# Patient Record
Sex: FEMALE | Race: WHITE | NOT HISPANIC OR LATINO | Employment: UNEMPLOYED | URBAN - METROPOLITAN AREA
[De-identification: names, ages, dates, MRNs, and addresses within clinical notes are randomized per-mention and may not be internally consistent; named-entity substitution may affect disease eponyms.]

---

## 2017-02-24 ENCOUNTER — OFFICE VISIT (OUTPATIENT)
Dept: ENDOCRINOLOGY | Facility: CLINIC | Age: 57
End: 2017-02-24

## 2017-02-24 VITALS
OXYGEN SATURATION: 99 % | SYSTOLIC BLOOD PRESSURE: 124 MMHG | DIASTOLIC BLOOD PRESSURE: 78 MMHG | WEIGHT: 168.4 LBS | HEIGHT: 64 IN | BODY MASS INDEX: 28.75 KG/M2 | HEART RATE: 82 BPM

## 2017-02-24 DIAGNOSIS — D35.1 PARATHYROID ADENOMA: ICD-10-CM

## 2017-02-24 DIAGNOSIS — M85.80 OSTEOPENIA: ICD-10-CM

## 2017-02-24 DIAGNOSIS — E89.0 POSTABLATIVE HYPOTHYROIDISM: Primary | ICD-10-CM

## 2017-02-24 LAB
ALBUMIN SERPL-MCNC: 4.6 G/DL (ref 3.2–4.8)
ALBUMIN/GLOB SERPL: 1.7 G/DL (ref 1.5–2.5)
ALP SERPL-CCNC: 80 U/L (ref 25–100)
ALT SERPL W P-5'-P-CCNC: 22 U/L (ref 7–40)
ANION GAP SERPL CALCULATED.3IONS-SCNC: 7 MMOL/L (ref 3–11)
AST SERPL-CCNC: 23 U/L (ref 0–33)
BILIRUB SERPL-MCNC: 0.4 MG/DL (ref 0.3–1.2)
BUN BLD-MCNC: 20 MG/DL (ref 9–23)
BUN/CREAT SERPL: 25 (ref 7–25)
CALCIUM SPEC-SCNC: 10.6 MG/DL (ref 8.7–10.4)
CHLORIDE SERPL-SCNC: 105 MMOL/L (ref 99–109)
CO2 SERPL-SCNC: 31 MMOL/L (ref 20–31)
CREAT BLD-MCNC: 0.8 MG/DL (ref 0.6–1.3)
GFR SERPL CREATININE-BSD FRML MDRD: 74 ML/MIN/1.73
GLOBULIN UR ELPH-MCNC: 2.7 GM/DL
GLUCOSE BLD-MCNC: 89 MG/DL (ref 70–100)
POTASSIUM BLD-SCNC: 4.2 MMOL/L (ref 3.5–5.5)
PROT SERPL-MCNC: 7.3 G/DL (ref 5.7–8.2)
SODIUM BLD-SCNC: 143 MMOL/L (ref 132–146)
T4 FREE SERPL-MCNC: 1.28 NG/DL (ref 0.89–1.76)
TSH SERPL DL<=0.05 MIU/L-ACNC: 3.04 MIU/ML (ref 0.35–5.35)

## 2017-02-24 PROCEDURE — 84439 ASSAY OF FREE THYROXINE: CPT | Performed by: INTERNAL MEDICINE

## 2017-02-24 PROCEDURE — 99244 OFF/OP CNSLTJ NEW/EST MOD 40: CPT | Performed by: INTERNAL MEDICINE

## 2017-02-24 PROCEDURE — 80053 COMPREHEN METABOLIC PANEL: CPT | Performed by: INTERNAL MEDICINE

## 2017-02-24 PROCEDURE — 84443 ASSAY THYROID STIM HORMONE: CPT | Performed by: INTERNAL MEDICINE

## 2017-02-24 RX ORDER — FAMOTIDINE 20 MG/1
40 TABLET, FILM COATED ORAL
COMMUNITY
Start: 2014-12-12 | End: 2018-07-26 | Stop reason: ALTCHOICE

## 2017-02-24 RX ORDER — HYDROCHLOROTHIAZIDE 12.5 MG/1
CAPSULE, GELATIN COATED ORAL
COMMUNITY
Start: 2014-02-13 | End: 2018-07-26 | Stop reason: SDUPTHER

## 2017-02-24 RX ORDER — LEVOTHYROXINE SODIUM 100 UG/1
100 CAPSULE ORAL DAILY
Qty: 30 CAPSULE | Refills: 11 | Status: SHIPPED | OUTPATIENT
Start: 2017-02-24 | End: 2017-09-27 | Stop reason: DRUGHIGH

## 2017-02-24 RX ORDER — LEVOTHYROXINE SODIUM 112 MCG
TABLET ORAL
COMMUNITY
Start: 2017-02-01 | End: 2017-06-01 | Stop reason: ALTCHOICE

## 2017-02-24 RX ORDER — AMLODIPINE BESYLATE 5 MG/1
TABLET ORAL
COMMUNITY
Start: 2014-02-13 | End: 2018-07-26 | Stop reason: SDUPTHER

## 2017-02-24 RX ORDER — NEPAFENAC 0.3 %
SUSPENSION, DROPS(FINAL DOSAGE FORM)(ML) OPHTHALMIC (EYE)
COMMUNITY
Start: 2016-12-07 | End: 2017-06-01

## 2017-02-24 RX ORDER — BUPROPION HYDROCHLORIDE 300 MG/1
TABLET ORAL
COMMUNITY
Start: 2017-02-14 | End: 2017-09-27

## 2017-02-24 RX ORDER — TRIPROLIDINE/PSEUDOEPHEDRINE 2.5MG-60MG
TABLET ORAL
COMMUNITY
Start: 2017-02-12 | End: 2017-06-01

## 2017-02-24 RX ORDER — POTASSIUM CHLORIDE 1500 MG/1
TABLET, EXTENDED RELEASE ORAL
COMMUNITY
Start: 2017-02-01 | End: 2018-07-26 | Stop reason: SDUPTHER

## 2017-02-24 NOTE — PROGRESS NOTES
Subjective:   Thyroid Problem (New pt, Graves' Disease. Pt stated she had a parathyroidectomy in Jan. 2017. C/o unstable tsh levels for over 26 years. Having hair loss and fatigue. )        Carissa Medina is a 56 y.o. female who is being seen for consultation today at the request of Benji Elise MD  for management of Graves' disease and hypothyroidism.     Radioiodine indiced hypothyroidism since 2000. She has a diagnoses of Graves disease at the age of 21.  Underwent PETE tx in 2000.  she started levothyroxine replacement shortly afterwards and reported continuous change in the dose.   In the last year she required 6 dose adjustments in the medication. She takes it on the empty stomach first thing in the morning, multivitamins famotidine and other medications are approximately 1 hour later. She is compliant and consistent with medication. Patient reported sensitivity to minor dose change in thyroid medication with symptoms of agitation, internal tremors, anxiety and palpitations when TSH is below 1.5. Her sx of hypothyroidism occur when she is over 4. She reported that she doesn't seem to regulate the levels.   Labs 08/25/2016 showed TSH of 4.08, free T4 of total T4 of 8.3, PTH level is 47 and ionized calcium is normal at 5.6. Her levels were increased. She was dx with primary hyperparathyroidism and underwent parathyroidectomy by DR Louis in January 2016. Her sx of fatigue and bone pains improved immediately after the surgery. Calcium was normal at 9.9 in 1/17/2017  She brought records from the last 6 months labs and they were reviewed and summarized.   Michael 3 2017 - TSH is 4.59 and the dose increased to 112. 1/17/2017 labs in Dr Louis's office showed TSH of 2.5    Current Symptoms consist of anxiousness, tremulousness, weight loss, increased appetite. Feels that her levels might be too high.      She has started a gluten free diet together with her  and noted that when she is strict with the diet then  her thyroid levels are going up and she needs a dose change in medication.     Review of Systems  Review of Systems   Constitutional: Positive for fatigue and unexpected weight change (fluctuations in weight). Negative for activity change, appetite change, chills and diaphoresis.   HENT: Negative for congestion, ear pain, facial swelling, hearing loss, postnasal drip, sore throat, trouble swallowing and voice change.    Eyes: Negative.  Negative for redness, itching and visual disturbance.   Respiratory: Negative for cough and shortness of breath.    Cardiovascular: Negative for chest pain, palpitations and leg swelling.   Gastrointestinal: Negative for abdominal distention, abdominal pain, constipation, diarrhea, nausea and vomiting.   Endocrine:        As listed in HPI   Genitourinary: Negative.    Musculoskeletal: Negative for arthralgias, back pain, joint swelling, myalgias, neck pain and neck stiffness.   Skin: Negative.    Allergic/Immunologic: Positive for environmental allergies (gluten sensitivity).   Neurological: Positive for tremors. Negative for dizziness, syncope, weakness, light-headedness and headaches.   Hematological: Negative.    Psychiatric/Behavioral: The patient is nervous/anxious.    All other systems reviewed and are negative.     Current medications:  Current Outpatient Prescriptions   Medication Sig Dispense Refill   • amLODIPine (NORVASC) 5 MG tablet Take  by mouth.     • aspirin 81 MG tablet Take  by mouth Daily.     • famotidine (PEPCID) 20 MG tablet Take  by mouth.     • hydrochlorothiazide (MICROZIDE) 12.5 MG capsule Take  by mouth.     • buPROPion XL (WELLBUTRIN XL) 300 MG 24 hr tablet      • DUREZOL 0.05 % ophthalmic emulsion      • ILEVRO 0.3 % suspension      • KLOR-CON 20 MEQ CR tablet      • SYNTHROID 112 MCG tablet      • TIROSINT 100 MCG capsule Take 1 capsule by mouth Daily. Patient requiring frequent dose change with the Synthroid due to absorption problem 30 capsule 11      No current facility-administered medications for this visit.        The following portions of the patient's history were reviewed and updated as appropriate: She  has a past medical history of Acid reflux; Depression; Graves disease; HTN (hypertension); Hyperlipidemia; Migraine; Osteopenia; and Parathyroid adenoma.  She  has a past surgical history that includes  section, classic; Sinus surgery; Cataract extraction; Parathyroidectomy (01/10/2017); and Vitrectomy.  Her family history includes Arthritis in her father and mother; Graves' disease in her father; Hyperlipidemia in her mother; Hypertension in her father and mother; Kidney disease in her father; Melanoma in her father; Prostate cancer in her father; Stroke in her mother.  She  reports that she has never smoked. She does not have any smokeless tobacco history on file. She reports that she does not drink alcohol. Her drug history is not on file.  She is allergic to estrogens; statins; and zoloft [sertraline hcl]..      Objective:     Vitals:    17 0906   BP: 124/78   Pulse: 82   SpO2: 99%   Body mass index is 28.91 kg/(m^2).  Physical Exam   Constitutional: She is oriented to person, place, and time. She appears well-developed and well-nourished.   HENT:   Head: Normocephalic and atraumatic.   Mouth/Throat: Oropharynx is clear and moist.   Eyes: Conjunctivae are normal.   Neck: Normal range of motion. No muscular tenderness present. Carotid bruit is not present. No thyroid mass and no thyromegaly present.   The neck is supple and no assimetry visualized   Cardiovascular: Normal rate, regular rhythm and normal heart sounds.    Pulmonary/Chest: Effort normal and breath sounds normal.   Musculoskeletal: She exhibits no edema.   Lymphadenopathy:     She has no cervical adenopathy.   Neurological: She is alert and oriented to person, place, and time.   Skin: Skin is warm. No rash noted.   Psychiatric: She has a normal mood and affect. Thought  content normal.   Vitals reviewed.      LABS AND IMAGING  Labs wer reviewed and summarized above.             Assessment:        Problem List Items Addressed This Visit        Endocrine    Postablative hypothyroidism - Primary    Relevant Medications    SYNTHROID 112 MCG tablet    TIROSINT 100 MCG capsule    Other Relevant Orders    TSH    T4, Free    Comprehensive Metabolic Panel      Other Visit Diagnoses     Parathyroid adenoma        Osteopenia            Diagnosis was discussed and reviewed with the patient including the advantages of drug therapy.          Plan:        1. Patient appears clinically mildly hyperthyroid. She is very sensitive to any dose change and has a narrow TSH window for symptomatic response.   Tirosint could be a good option for this patient as absorption is more consistent,   -sample of lower dose 100 mcg Tirosint given  -repeat labs and I will give further recommendations based on the results.       We have discussed in details the nature of the thyroid disease, thyroid hormone action, optimal TSH goals of 0.5-3.5, method of administration of levothyroxine and medication interactions.  I recommended taking the medication on an empty stomach in the morning or at bedtime, at least 30 minutes prior to intake of food or hot drinks and 4 hours apart from calcium or iron supplements.    Patient will return to our office in 3 months with labs before the visit.   The risks and benefits of my recommendations, as well as other treatment options were discussed with the patient today. Questions were answered.

## 2017-03-14 ENCOUNTER — TRANSCRIBE ORDERS (OUTPATIENT)
Dept: ADMINISTRATIVE | Facility: HOSPITAL | Age: 57
End: 2017-03-14

## 2017-03-14 DIAGNOSIS — Z12.31 VISIT FOR SCREENING MAMMOGRAM: Primary | ICD-10-CM

## 2017-04-24 ENCOUNTER — HOSPITAL ENCOUNTER (OUTPATIENT)
Dept: MAMMOGRAPHY | Facility: HOSPITAL | Age: 57
Discharge: HOME | End: 2017-04-24
Admitting: OBSTETRICS & GYNECOLOGY

## 2017-04-24 DIAGNOSIS — Z12.31 VISIT FOR SCREENING MAMMOGRAM: ICD-10-CM

## 2017-04-24 PROCEDURE — 77063 BREAST TOMOSYNTHESIS BI: CPT

## 2017-04-24 PROCEDURE — 77063 BREAST TOMOSYNTHESIS BI: CPT | Performed by: RADIOLOGY

## 2017-04-24 PROCEDURE — G0202 SCR MAMMO BI INCL CAD: HCPCS

## 2017-04-24 PROCEDURE — 77067 SCR MAMMO BI INCL CAD: CPT | Performed by: RADIOLOGY

## 2017-05-09 ENCOUNTER — TELEPHONE (OUTPATIENT)
Dept: INTERNAL MEDICINE | Facility: CLINIC | Age: 57
End: 2017-05-09

## 2017-05-09 DIAGNOSIS — E05.00 GRAVES DISEASE: Primary | ICD-10-CM

## 2017-05-24 ENCOUNTER — LAB (OUTPATIENT)
Dept: INTERNAL MEDICINE | Facility: CLINIC | Age: 57
End: 2017-05-24

## 2017-05-24 DIAGNOSIS — E05.00 GRAVES DISEASE: ICD-10-CM

## 2017-05-24 LAB
ALBUMIN SERPL-MCNC: 4.4 G/DL (ref 3.2–4.8)
ALBUMIN/GLOB SERPL: 1.8 G/DL (ref 1.5–2.5)
ALP SERPL-CCNC: 60 U/L (ref 25–100)
ALT SERPL W P-5'-P-CCNC: 19 U/L (ref 7–40)
ANION GAP SERPL CALCULATED.3IONS-SCNC: 3 MMOL/L (ref 3–11)
AST SERPL-CCNC: 23 U/L (ref 0–33)
BILIRUB SERPL-MCNC: 0.5 MG/DL (ref 0.3–1.2)
BUN BLD-MCNC: 18 MG/DL (ref 9–23)
BUN/CREAT SERPL: 25.7 (ref 7–25)
CA-I SERPL ISE-MCNC: 1.25 MMOL/L (ref 1.12–1.32)
CALCIUM SPEC-SCNC: 9.7 MG/DL (ref 8.7–10.4)
CHLORIDE SERPL-SCNC: 105 MMOL/L (ref 99–109)
CO2 SERPL-SCNC: 32 MMOL/L (ref 20–31)
CREAT BLD-MCNC: 0.7 MG/DL (ref 0.6–1.3)
GFR SERPL CREATININE-BSD FRML MDRD: 86 ML/MIN/1.73
GLOBULIN UR ELPH-MCNC: 2.4 GM/DL
GLUCOSE BLD-MCNC: 97 MG/DL (ref 70–100)
POTASSIUM BLD-SCNC: 3.9 MMOL/L (ref 3.5–5.5)
PROT SERPL-MCNC: 6.8 G/DL (ref 5.7–8.2)
SODIUM BLD-SCNC: 140 MMOL/L (ref 132–146)
T4 FREE SERPL-MCNC: 1.19 NG/DL (ref 0.89–1.76)
TSH SERPL DL<=0.05 MIU/L-ACNC: 3.98 MIU/ML (ref 0.35–5.35)

## 2017-05-24 PROCEDURE — 80053 COMPREHEN METABOLIC PANEL: CPT | Performed by: INTERNAL MEDICINE

## 2017-05-24 PROCEDURE — 84443 ASSAY THYROID STIM HORMONE: CPT | Performed by: INTERNAL MEDICINE

## 2017-05-24 PROCEDURE — 84439 ASSAY OF FREE THYROXINE: CPT | Performed by: INTERNAL MEDICINE

## 2017-05-24 PROCEDURE — 82330 ASSAY OF CALCIUM: CPT | Performed by: INTERNAL MEDICINE

## 2017-06-01 ENCOUNTER — OFFICE VISIT (OUTPATIENT)
Dept: ENDOCRINOLOGY | Facility: CLINIC | Age: 57
End: 2017-06-01

## 2017-06-01 VITALS
BODY MASS INDEX: 28.82 KG/M2 | WEIGHT: 168.8 LBS | HEIGHT: 64 IN | DIASTOLIC BLOOD PRESSURE: 76 MMHG | OXYGEN SATURATION: 96 % | HEART RATE: 54 BPM | SYSTOLIC BLOOD PRESSURE: 124 MMHG

## 2017-06-01 DIAGNOSIS — E89.0 POSTABLATIVE HYPOTHYROIDISM: Primary | ICD-10-CM

## 2017-06-01 PROCEDURE — 99213 OFFICE O/P EST LOW 20 MIN: CPT | Performed by: INTERNAL MEDICINE

## 2017-06-01 RX ORDER — POLYETHYLENE GLYCOL 3350 17 G/17G
POWDER, FOR SOLUTION ORAL
COMMUNITY

## 2017-06-01 RX ORDER — ECHINACEA 400 MG
CAPSULE ORAL
COMMUNITY

## 2017-06-01 NOTE — PROGRESS NOTES
Subjective:   Hypothyroidism (F/u for hypothyroidism, would like to discuss results of recent labs. Pt stated she has been feeling much better on Tirosint)         Follow-up visit for management of Graves' disease and hypothyroidism.     Radioiodine indiced hypothyroidism since 2000. She has a diagnoses of Graves disease at the age of 21.  Underwent PETE tx in 2000.  she started levothyroxine replacement shortly afterwards and reported continuous change in the dose.   In the last year she required 6 dose adjustments in the medication. She takes it on the empty stomach first thing in the morning, multivitamins famotidine and other medications are approximately 1 hour later. She is compliant and consistent with medication. Patient reported sensitivity to minor dose change in thyroid medication with symptoms of agitation, internal tremors, anxiety and palpitations when TSH is below 1.5. Her sx of hypothyroidism occur when she is over 4. She reported that she doesn't seem to regulate the levels.   Labs 08/25/2016 showed TSH of 4.08, free T4 of total T4 of 8.3, PTH level is 47 and ionized calcium is normal at 5.6. Her levels were increased. She was dx with primary hyperparathyroidism and underwent parathyroidectomy by DR Louis in January 2016. Her sx of fatigue and bone pains improved immediately after the surgery. Calcium was normal at 9.9 in 1/17/2017  She brought records from the last 6 months labs and they were reviewed and summarized.   Michael 3 2017 - TSH is 4.59 and the dose increased to 112. 1/17/2017 labs in Dr Louis's office showed TSH of 2.5. Calcium was 10.6 on previous results and repeat labs are normal.   Last visit her tsh was 3 and we have changed her tx to Tirosint 100 mcg daily. Feels better and her sx are stable.      She has started a gluten free diet together with her  and noted that when she is strict with the diet then her thyroid levels are going up and she needs a dose change in medication.          Review of Systems  Review of Systems   Constitutional: Positive for fatigue and unexpected weight change (fluctuations in weight). Negative for activity change, appetite change, chills and diaphoresis.   HENT: Negative for congestion, ear pain, facial swelling, hearing loss, postnasal drip, sore throat, trouble swallowing and voice change.    Eyes: Negative.  Negative for redness, itching and visual disturbance.   Respiratory: Negative for cough and shortness of breath.    Cardiovascular: Negative for chest pain, palpitations and leg swelling.   Gastrointestinal: Negative for abdominal distention, abdominal pain, constipation, diarrhea, nausea and vomiting.   Endocrine:        As listed in HPI   Genitourinary: Negative.    Musculoskeletal: Negative for arthralgias, back pain, joint swelling, myalgias, neck pain and neck stiffness.   Skin: Negative.    Allergic/Immunologic: Positive for environmental allergies (gluten sensitivity).   Neurological: Positive for tremors. Negative for dizziness, syncope, weakness, light-headedness and headaches.   Hematological: Negative.    Psychiatric/Behavioral: The patient is nervous/anxious.    All other systems reviewed and are negative.     Current medications:  Current Outpatient Prescriptions   Medication Sig Dispense Refill   • vitamin d (RA VITAMIN D-3) 5000 UNITS capsule Take  by mouth.     • amLODIPine (NORVASC) 5 MG tablet Take  by mouth.     • aspirin 81 MG tablet Take  by mouth Daily.     • buPROPion XL (WELLBUTRIN XL) 300 MG 24 hr tablet      • famotidine (PEPCID) 20 MG tablet Take  by mouth.     • Flaxseed, Linseed, (FLAXSEED OIL) 1000 MG capsule      • hydrochlorothiazide (MICROZIDE) 12.5 MG capsule Take  by mouth.     • KLOR-CON 20 MEQ CR tablet      • Magnesium 400 MG capsule      • Multiple Vitamins-Minerals (MULTIVITAMIN WOMEN 50+) tablet      • polyethylene glycol (MIRALAX) powder      • Probiotic Product (ACIDOPHILUS PROBIOTIC BLEND PO)      • TIROSINT  100 MCG capsule Take 1 capsule by mouth Daily. Patient requiring frequent dose change with the Synthroid due to absorption problem 30 capsule 11     No current facility-administered medications for this visit.        The following portions of the patient's history were reviewed and updated as appropriate: She  has a past medical history of Acid reflux; Depression; Graves disease; HTN (hypertension); Hyperlipidemia; Migraine; Osteopenia; and Parathyroid adenoma.  She  has a past surgical history that includes  section, classic; Sinus surgery; Cataract extraction; Parathyroidectomy (01/10/2017); and Vitrectomy.  Her family history includes Arthritis in her father and mother; Graves' disease in her father; Hyperlipidemia in her mother; Hypertension in her father and mother; Kidney disease in her father; Melanoma in her father; Prostate cancer in her father; Stroke in her mother. There is no history of Breast cancer or Ovarian cancer.  She  reports that she has never smoked. She does not have any smokeless tobacco history on file. She reports that she does not drink alcohol. Her drug history is not on file.  She is allergic to estrogens; statins; and zoloft [sertraline hcl]..      Objective:     Vitals:    17 1044   BP: 124/76   Pulse: 54   SpO2: 96%   Body mass index is 28.97 kg/(m^2).  Physical Exam   Constitutional: She is oriented to person, place, and time. She appears well-developed and well-nourished.   HENT:   Head: Normocephalic and atraumatic.   Mouth/Throat: Oropharynx is clear and moist.   Eyes: Conjunctivae are normal.   Neck: Normal range of motion. No muscular tenderness present. Carotid bruit is not present. No thyroid mass and no thyromegaly present.   The neck is supple and no assimetry visualized   Cardiovascular: Normal rate, regular rhythm and normal heart sounds.    Pulmonary/Chest: Effort normal and breath sounds normal.   Musculoskeletal: She exhibits no edema.   Lymphadenopathy:      She has no cervical adenopathy.   Neurological: She is alert and oriented to person, place, and time.   Skin: Skin is warm. No rash noted.   Psychiatric: She has a normal mood and affect. Thought content normal.   Vitals reviewed.      LABS AND IMAGING  Results for orders placed or performed in visit on 05/24/17   TSH   Result Value Ref Range    TSH 3.980 0.350 - 5.350 mIU/mL   T4, Free   Result Value Ref Range    Free T4 1.19 0.89 - 1.76 ng/dL   Comprehensive Metabolic Panel   Result Value Ref Range    Glucose 97 70 - 100 mg/dL    BUN 18 9 - 23 mg/dL    Creatinine 0.70 0.60 - 1.30 mg/dL    Sodium 140 132 - 146 mmol/L    Potassium 3.9 3.5 - 5.5 mmol/L    Chloride 105 99 - 109 mmol/L    CO2 32.0 (H) 20.0 - 31.0 mmol/L    Calcium 9.7 8.7 - 10.4 mg/dL    Total Protein 6.8 5.7 - 8.2 g/dL    Albumin 4.40 3.20 - 4.80 g/dL    ALT (SGPT) 19 7 - 40 U/L    AST (SGOT) 23 0 - 33 U/L    Alkaline Phosphatase 60 25 - 100 U/L    Total Bilirubin 0.5 0.3 - 1.2 mg/dL    eGFR Non African Amer 86 >60 mL/min/1.73    Globulin 2.4 gm/dL    A/G Ratio 1.8 1.5 - 2.5 g/dL    BUN/Creatinine Ratio 25.7 (H) 7.0 - 25.0    Anion Gap 3.0 3.0 - 11.0 mmol/L   Calcium, Ionized   Result Value Ref Range    Ionized Calcium 1.25 1.12 - 1.32 mmol/L               Assessment:        Problem List Items Addressed This Visit        Endocrine    Postablative hypothyroidism - Primary        Diagnosis was discussed and reviewed with the patient including the advantages of drug therapy.          Plan:        -cont 100 mcg Tirosint given  -reviewed  labs   and thyroid function is normal.

## 2017-06-01 NOTE — PATIENT INSTRUCTIONS
Results for orders placed or performed in visit on 05/24/17   TSH   Result Value Ref Range    TSH 3.980 0.350 - 5.350 mIU/mL   T4, Free   Result Value Ref Range    Free T4 1.19 0.89 - 1.76 ng/dL   Comprehensive Metabolic Panel   Result Value Ref Range    Glucose 97 70 - 100 mg/dL    BUN 18 9 - 23 mg/dL    Creatinine 0.70 0.60 - 1.30 mg/dL    Sodium 140 132 - 146 mmol/L    Potassium 3.9 3.5 - 5.5 mmol/L    Chloride 105 99 - 109 mmol/L    CO2 32.0 (H) 20.0 - 31.0 mmol/L    Calcium 9.7 8.7 - 10.4 mg/dL    Total Protein 6.8 5.7 - 8.2 g/dL    Albumin 4.40 3.20 - 4.80 g/dL    ALT (SGPT) 19 7 - 40 U/L    AST (SGOT) 23 0 - 33 U/L    Alkaline Phosphatase 60 25 - 100 U/L    Total Bilirubin 0.5 0.3 - 1.2 mg/dL    eGFR Non African Amer 86 >60 mL/min/1.73    Globulin 2.4 gm/dL    A/G Ratio 1.8 1.5 - 2.5 g/dL    BUN/Creatinine Ratio 25.7 (H) 7.0 - 25.0    Anion Gap 3.0 3.0 - 11.0 mmol/L   Calcium, Ionized   Result Value Ref Range    Ionized Calcium 1.25 1.12 - 1.32 mmol/L

## 2017-08-29 ENCOUNTER — TRANSCRIBE ORDERS (OUTPATIENT)
Dept: ADMINISTRATIVE | Facility: HOSPITAL | Age: 57
End: 2017-08-29

## 2017-08-29 DIAGNOSIS — R79.89 LFT ELEVATION: ICD-10-CM

## 2017-08-29 DIAGNOSIS — R63.4 WEIGHT LOSS: ICD-10-CM

## 2017-08-29 DIAGNOSIS — R53.83 FATIGUE, UNSPECIFIED TYPE: Primary | ICD-10-CM

## 2017-08-29 DIAGNOSIS — M54.9 BACK PAIN WITH RADIATION: ICD-10-CM

## 2017-09-11 ENCOUNTER — HOSPITAL ENCOUNTER (OUTPATIENT)
Dept: CT IMAGING | Facility: HOSPITAL | Age: 57
Discharge: HOME | End: 2017-09-11
Attending: INTERNAL MEDICINE | Admitting: INTERNAL MEDICINE

## 2017-09-11 DIAGNOSIS — R79.89 LFT ELEVATION: ICD-10-CM

## 2017-09-11 DIAGNOSIS — R63.4 WEIGHT LOSS: ICD-10-CM

## 2017-09-11 DIAGNOSIS — M54.9 BACK PAIN WITH RADIATION: ICD-10-CM

## 2017-09-11 DIAGNOSIS — R53.83 FATIGUE, UNSPECIFIED TYPE: ICD-10-CM

## 2017-09-11 PROCEDURE — 0 IOPAMIDOL 61 % SOLUTION: Performed by: INTERNAL MEDICINE

## 2017-09-11 PROCEDURE — 74177 CT ABD & PELVIS W/CONTRAST: CPT

## 2017-09-11 RX ADMIN — IOPAMIDOL 95 ML: 612 INJECTION, SOLUTION INTRAVENOUS at 12:15

## 2017-09-27 ENCOUNTER — OFFICE VISIT (OUTPATIENT)
Dept: CARDIOLOGY | Facility: CLINIC | Age: 57
End: 2017-09-27

## 2017-09-27 VITALS
DIASTOLIC BLOOD PRESSURE: 80 MMHG | HEART RATE: 60 BPM | BODY MASS INDEX: 26.4 KG/M2 | SYSTOLIC BLOOD PRESSURE: 134 MMHG | HEIGHT: 64 IN | WEIGHT: 154.6 LBS

## 2017-09-27 DIAGNOSIS — R07.89 OTHER CHEST PAIN: Primary | ICD-10-CM

## 2017-09-27 DIAGNOSIS — E78.00 PURE HYPERCHOLESTEROLEMIA: ICD-10-CM

## 2017-09-27 DIAGNOSIS — I10 ESSENTIAL HYPERTENSION: ICD-10-CM

## 2017-09-27 DIAGNOSIS — R53.83 FATIGUE, UNSPECIFIED TYPE: ICD-10-CM

## 2017-09-27 PROCEDURE — 99213 OFFICE O/P EST LOW 20 MIN: CPT | Performed by: INTERNAL MEDICINE

## 2017-09-27 PROCEDURE — 93000 ELECTROCARDIOGRAM COMPLETE: CPT | Performed by: INTERNAL MEDICINE

## 2017-09-27 RX ORDER — LEVOTHYROXINE SODIUM 88 UG/1
88 CAPSULE ORAL DAILY
COMMUNITY
End: 2017-12-01

## 2017-09-27 RX ORDER — OLOPATADINE HYDROCHLORIDE 1 MG/ML
1 SOLUTION/ DROPS OPHTHALMIC AS NEEDED
COMMUNITY

## 2017-09-27 NOTE — PROGRESS NOTES
Subjective:     Encounter Date:2017      Patient ID: Carissa Medina is a 57 y.o. female.    Chief Complaint:Hypertension (consult); Fatigue; Dizziness; Shortness of Breath; and irregular heartbeat    PROBLEM LIST:  1. Fatigue  2. Dyslipidemia  3. Hypertension  4. GERD  5. Polymyalgia rheumatica  6. Temporal arteritis  7. History of Graves' disease status post radiation  8. Anxiety/depression  9. Migraine  10. Persistently, mildly elevated AST/ALT  11. Surgeries:  a. Bilateral cataract extraction  b.  section x 2  c. Sinus surgery  d. Vitrectomy  e. Parathyroidectomy     Allergies   Allergen Reactions   • Estrogens      Oral Estrogens   • Statins    • Zoloft [Sertraline Hcl]          Current Outpatient Prescriptions:   •  amLODIPine (NORVASC) 5 MG tablet, Take  by mouth., Disp: , Rfl:   •  aspirin 81 MG tablet, Take  by mouth Daily., Disp: , Rfl:   •  famotidine (PEPCID) 20 MG tablet, Take 40 mg by mouth., Disp: , Rfl:   •  Flaxseed, Linseed, (FLAXSEED OIL) 1000 MG capsule, , Disp: , Rfl:   •  hydrochlorothiazide (MICROZIDE) 12.5 MG capsule, Take  by mouth., Disp: , Rfl:   •  KLOR-CON 20 MEQ CR tablet, , Disp: , Rfl:   •  levothyroxine sodium (TIROSINT) 88 MCG capsule, Take 88 mcg by mouth Daily., Disp: , Rfl:   •  Magnesium 400 MG capsule, , Disp: , Rfl:   •  Multiple Vitamins-Minerals (MULTIVITAMIN WOMEN 50+) tablet, , Disp: , Rfl:   •  olopatadine (PATANOL) 0.1 % ophthalmic solution, 1 drop As Needed for Allergies., Disp: , Rfl:   •  polyethylene glycol (MIRALAX) powder, , Disp: , Rfl:   •  PREDNISONE PO, Take 25 mg by mouth Daily., Disp: , Rfl:   •  Probiotic Product (ACIDOPHILUS PROBIOTIC BLEND PO), , Disp: , Rfl:   •  vitamin d (RA VITAMIN D-3) 5000 UNITS capsule, Take  by mouth., Disp: , Rfl:         History of Present Illness    Patient is a 57-year-old  female who we are seeing today for further evaluation of fatigue.  She has no previous history of coronary disease.  She's been  recently diagnosed with polymyalgia rheumatica and started on steroid therapy.  She's had some fatigue for a few years.  She notes that she had her parathyroidectomy performed in January of this year and at that time it did seem to improve.  However in June it resumed.  At that time she was also noted to have some elevated LFTs for which she had a CT of her abdomen performed.  She is also been since that time diagnosed with polymyalgia rheumatica and temporal arteritis.  She has been started on steroids for this.  She does have some occasional chest pain which is followed by belching.  Does note some shortness of breath with activity.  However, notes that she has been fairly inactive due to her fatigue.  No reported syncope, near syncope.  Patient also notes that she recently went on a low carb diet.  She lost 12 pounds intentionally.  Notes that after this she had lipid panel 3 performed which showed a decreased HDL.  However, of note her LDL was improved.  Exertional mid scapular back pain.  Also is there when she stands too long.    The following portions of the patient's history were reviewed and updated as appropriate: allergies, current medications, past family history, past medical history, past social history, past surgical history and problem list.    Family History   Problem Relation Age of Onset   • Arthritis Mother    • Hypertension Mother    • Hyperlipidemia Mother    • Stroke Mother    • Arthritis Father    • Melanoma Father    • Prostate cancer Father    • Hypertension Father    • Kidney disease Father    • Graves' disease Father    • No Known Problems Sister    • No Known Problems Brother    • No Known Problems Brother    • No Known Problems Son    • No Known Problems Son    • Breast cancer Neg Hx    • Ovarian cancer Neg Hx        Social History   Substance Use Topics   • Smoking status: Never Smoker   • Smokeless tobacco: Never Used   • Alcohol use No         Review of Systems   Constitution:  "Positive for malaise/fatigue and weight loss (ntentional). Negative for fever and weakness.   HENT: Negative for nosebleeds.    Eyes: Negative for redness and visual disturbance.   Cardiovascular: Negative for orthopnea, palpitations and paroxysmal nocturnal dyspnea.   Respiratory: Negative for cough, snoring, sputum production and wheezing.    Hematologic/Lymphatic: Negative for bleeding problem.   Skin: Negative for flushing, itching and rash.   Musculoskeletal: Positive for back pain and myalgias. Negative for falls, joint pain and muscle cramps.   Gastrointestinal: Positive for heartburn. Negative for abdominal pain, diarrhea, nausea and vomiting.   Genitourinary: Positive for bladder incontinence. Negative for hematuria.   Neurological: Positive for excessive daytime sleepiness and dizziness. Negative for headaches and tremors.   Psychiatric/Behavioral: Negative for substance abuse. The patient is not nervous/anxious.           Objective:    height is 64\" (162.6 cm) and weight is 154 lb 9.6 oz (70.1 kg). Her blood pressure is 134/80 and her pulse is 60.         Physical Exam   Constitutional: She is oriented to person, place, and time. She appears well-developed and well-nourished.   HENT:   Head: Normocephalic and atraumatic.   Mouth/Throat: Oropharynx is clear and moist.   Eyes: Conjunctivae are normal. Pupils are equal, round, and reactive to light.   Neck: Normal carotid pulses and no JVD present. Carotid bruit is not present. No thyromegaly present.   Cardiovascular: Normal rate, regular rhythm, S1 normal and S2 normal.  Exam reveals no gallop and no friction rub.    No murmur heard.  Pulses:       Carotid pulses are 2+ on the right side, and 2+ on the left side.       Dorsalis pedis pulses are 2+ on the right side, and 2+ on the left side.        Posterior tibial pulses are 2+ on the right side, and 2+ on the left side.   Pulmonary/Chest: No respiratory distress. She has no wheezes. She has no rales. She " exhibits no tenderness.   Abdominal: She exhibits no distension, no abdominal bruit and no mass. There is no hepatosplenomegaly. There is no tenderness. There is no rebound.   Musculoskeletal: She exhibits no edema, tenderness or deformity.   Lymphadenopathy:     She has no cervical adenopathy.   Neurological: She is alert and oriented to person, place, and time. She has normal strength.   Skin: Skin is warm and dry. No rash noted. No cyanosis. Nails show no clubbing.   Psychiatric: She has a normal mood and affect. Cognition and memory are normal.         ECG 12 Lead  Date/Time: 9/27/2017 3:34 PM  Performed by: KALE PIKE  Authorized by: KALE PIKE   Rhythm: sinus rhythm  Clinical impression: normal ECG                  Assessment:   Assessment/Plan      Carissa was seen today for hypertension, fatigue, dizziness, shortness of breath and irregular heartbeat.    Diagnoses and all orders for this visit:    Other chest pain  -     Adult Transthoracic Echo Complete W/ Cont if Necessary Per Protocol; Future  -     Stress Test With Myocardial Perfusion; Future    Essential hypertension    Fatigue, unspecified type    Pure hypercholesterolemia    Other orders  -     ECG 12 Lead      Atypical chest pain and malaise fatigue, possibly anginal equivalent .  We'll perform echocardiography and myocardial perfusion study.  Dyslipidemia, .  Does not report statin therapy at this time.  Long discussion regarding pros and cons of this.  I do not think is necessary currently.  Recent diagnosis of temporal arteritis/PMR.  May be the cause of her symptoms.          Allison SWEET scribed portions of this dictation for  Dr. Pike.  I, Kale Pike MD, personally performed the services described in this documentation as scribed by the above individual in my presence, and it is both accurate and complete'  Dictated utilizing Dragon dictation

## 2017-10-02 ENCOUNTER — TRANSCRIBE ORDERS (OUTPATIENT)
Dept: ADMINISTRATIVE | Facility: HOSPITAL | Age: 57
End: 2017-10-02

## 2017-10-02 ENCOUNTER — HOSPITAL ENCOUNTER (OUTPATIENT)
Dept: GENERAL RADIOLOGY | Facility: HOSPITAL | Age: 57
Discharge: HOME | End: 2017-10-02
Attending: INTERNAL MEDICINE | Admitting: INTERNAL MEDICINE

## 2017-10-02 DIAGNOSIS — R76.11 POSITIVE TB TEST: Primary | ICD-10-CM

## 2017-10-02 DIAGNOSIS — M54.9 MID BACK PAIN: ICD-10-CM

## 2017-10-02 PROCEDURE — 71020 HC CHEST PA AND LATERAL: CPT

## 2017-10-02 PROCEDURE — 72072 X-RAY EXAM THORAC SPINE 3VWS: CPT

## 2017-10-18 ENCOUNTER — HOSPITAL ENCOUNTER (OUTPATIENT)
Dept: CARDIOLOGY | Facility: HOSPITAL | Age: 57
Discharge: HOME | End: 2017-10-18
Attending: INTERNAL MEDICINE

## 2017-10-18 ENCOUNTER — HOSPITAL ENCOUNTER (OUTPATIENT)
Dept: CARDIOLOGY | Facility: HOSPITAL | Age: 57
Discharge: HOME | End: 2017-10-18
Attending: INTERNAL MEDICINE | Admitting: INTERNAL MEDICINE

## 2017-10-18 VITALS
HEIGHT: 64 IN | DIASTOLIC BLOOD PRESSURE: 74 MMHG | BODY MASS INDEX: 26.12 KG/M2 | SYSTOLIC BLOOD PRESSURE: 136 MMHG | WEIGHT: 153 LBS | HEART RATE: 78 BPM

## 2017-10-18 DIAGNOSIS — R07.89 OTHER CHEST PAIN: ICD-10-CM

## 2017-10-18 PROCEDURE — 93306 TTE W/DOPPLER COMPLETE: CPT

## 2017-10-18 PROCEDURE — 78452 HT MUSCLE IMAGE SPECT MULT: CPT

## 2017-10-18 PROCEDURE — 0 TECHNETIUM SESTAMIBI: Performed by: INTERNAL MEDICINE

## 2017-10-18 PROCEDURE — 93018 CV STRESS TEST I&R ONLY: CPT | Performed by: INTERNAL MEDICINE

## 2017-10-18 PROCEDURE — 93306 TTE W/DOPPLER COMPLETE: CPT | Performed by: INTERNAL MEDICINE

## 2017-10-18 PROCEDURE — 93017 CV STRESS TEST TRACING ONLY: CPT

## 2017-10-18 PROCEDURE — A9500 TC99M SESTAMIBI: HCPCS | Performed by: INTERNAL MEDICINE

## 2017-10-18 PROCEDURE — 78452 HT MUSCLE IMAGE SPECT MULT: CPT | Performed by: INTERNAL MEDICINE

## 2017-10-18 RX ADMIN — TECHNETIUM TC-99M SESTAMIBI 1 DOSE: 1 INJECTION INTRAVENOUS at 10:10

## 2017-10-18 RX ADMIN — TECHNETIUM TC-99M SESTAMIBI 1 DOSE: 1 INJECTION INTRAVENOUS at 12:05

## 2017-10-20 LAB
BH CV ECHO MEAS - AO ROOT AREA (BSA CORRECTED): 1.7
BH CV ECHO MEAS - AO ROOT AREA: 6.6 CM^2
BH CV ECHO MEAS - AO ROOT DIAM: 2.9 CM
BH CV ECHO MEAS - BSA(HAYCOCK): 1.8 M^2
BH CV ECHO MEAS - BSA: 1.8 M^2
BH CV ECHO MEAS - BZI_BMI: 26.4 KILOGRAMS/M^2
BH CV ECHO MEAS - BZI_METRIC_HEIGHT: 162.6 CM
BH CV ECHO MEAS - BZI_METRIC_WEIGHT: 69.9 KG
BH CV ECHO MEAS - EDV(CUBED): 104.5 ML
BH CV ECHO MEAS - EDV(TEICH): 102.9 ML
BH CV ECHO MEAS - EF(CUBED): 55.1 %
BH CV ECHO MEAS - EF(TEICH): 46.8 %
BH CV ECHO MEAS - ESV(CUBED): 46.9 ML
BH CV ECHO MEAS - ESV(TEICH): 54.7 ML
BH CV ECHO MEAS - FS: 23.4 %
BH CV ECHO MEAS - IVS/LVPW: 0.94
BH CV ECHO MEAS - IVSD: 0.83 CM
BH CV ECHO MEAS - LA DIMENSION: 3.2 CM
BH CV ECHO MEAS - LA/AO: 1.1
BH CV ECHO MEAS - LV MASS(C)D: 133.2 GRAMS
BH CV ECHO MEAS - LV MASS(C)DI: 76.1 GRAMS/M^2
BH CV ECHO MEAS - LVIDD: 4.7 CM
BH CV ECHO MEAS - LVIDS: 3.6 CM
BH CV ECHO MEAS - LVPWD: 0.88 CM
BH CV ECHO MEAS - MV A MAX VEL: 76.5 CM/SEC
BH CV ECHO MEAS - MV DEC SLOPE: 368.5 CM/SEC^2
BH CV ECHO MEAS - MV DEC TIME: 0.27 SEC
BH CV ECHO MEAS - MV E MAX VEL: 80 CM/SEC
BH CV ECHO MEAS - MV E/A: 1
BH CV ECHO MEAS - MV P1/2T MAX VEL: 99.9 CM/SEC
BH CV ECHO MEAS - MV P1/2T: 79.4 MSEC
BH CV ECHO MEAS - MVA P1/2T LCG: 2.2 CM^2
BH CV ECHO MEAS - MVA(P1/2T): 2.8 CM^2
BH CV ECHO MEAS - PA ACC SLOPE: 524 CM/SEC^2
BH CV ECHO MEAS - PA ACC TIME: 0.15 SEC
BH CV ECHO MEAS - PA PR(ACCEL): 12.5 MMHG
BH CV ECHO MEAS - RAP SYSTOLE: 3 MMHG
BH CV ECHO MEAS - RV MAX PG: 1 MMHG
BH CV ECHO MEAS - RV V1 MAX: 50.8 CM/SEC
BH CV ECHO MEAS - RVSP: 24.5 MMHG
BH CV ECHO MEAS - SI(CUBED): 32.9 ML/M^2
BH CV ECHO MEAS - SI(TEICH): 27.5 ML/M^2
BH CV ECHO MEAS - SV(CUBED): 57.6 ML
BH CV ECHO MEAS - SV(TEICH): 48.2 ML
BH CV ECHO MEAS - TAPSE (>1.6): 2.3 CM2
BH CV ECHO MEAS - TR MAX VEL: 231.8 CM/SEC
BH CV STRESS BP STAGE 1: NORMAL
BH CV STRESS BP STAGE 2: NORMAL
BH CV STRESS BP STAGE 3: NORMAL
BH CV STRESS DURATION MIN STAGE 1: 3
BH CV STRESS DURATION MIN STAGE 2: 3
BH CV STRESS DURATION MIN STAGE 3: 3
BH CV STRESS DURATION SEC STAGE 1: 0
BH CV STRESS DURATION SEC STAGE 2: 0
BH CV STRESS DURATION SEC STAGE 3: 0
BH CV STRESS GRADE STAGE 1: 10
BH CV STRESS GRADE STAGE 2: 12
BH CV STRESS GRADE STAGE 3: 14
BH CV STRESS HR STAGE 1: 109
BH CV STRESS HR STAGE 2: 129
BH CV STRESS HR STAGE 3: 155
BH CV STRESS METS STAGE 1: 5
BH CV STRESS METS STAGE 2: 7.5
BH CV STRESS METS STAGE 3: 10
BH CV STRESS O2 STAGE 1: 97
BH CV STRESS O2 STAGE 2: 98
BH CV STRESS O2 STAGE 3: 99
BH CV STRESS PROTOCOL 1: NORMAL
BH CV STRESS RECOVERY BP: NORMAL MMHG
BH CV STRESS RECOVERY HR: 99 BPM
BH CV STRESS SPEED STAGE 1: 1.7
BH CV STRESS SPEED STAGE 2: 2.5
BH CV STRESS SPEED STAGE 3: 3.4
BH CV STRESS STAGE 1: 1
BH CV STRESS STAGE 2: 2
BH CV STRESS STAGE 3: 3
BH CV VAS BP LEFT ARM: NORMAL MMHG
BH CV XLRA - RV BASE: 3.2 CM
BH CV XLRA - RV LENGTH: 7.1 CM
BH CV XLRA - RV MID: 3 CM
BH CV XLRA - TDI S': 14.1 CM/SEC
LEFT ATRIUM VOLUME INDEX: 22.9 ML/M2
LEFT ATRIUM VOLUME: 40 CM3
LV EF 2D ECHO EST: 60 %
LV EF NUC BP: 82 %
MAXIMAL PREDICTED HEART RATE: 161 BPM
PERCENT MAX PREDICTED HR: 96.27 %
STRESS BASELINE BP: NORMAL MMHG
STRESS BASELINE HR: 76 BPM
STRESS O2 SAT REST: 98 %
STRESS PERCENT HR: 113 %
STRESS POST ESTIMATED WORKLOAD: 10.1 METS
STRESS POST EXERCISE DUR MIN: 9 MIN
STRESS POST EXERCISE DUR SEC: 0 SEC
STRESS POST O2 SAT PEAK: 99 %
STRESS POST PEAK BP: NORMAL MMHG
STRESS POST PEAK HR: 155 BPM
STRESS TARGET HR: 137 BPM

## 2017-10-28 ENCOUNTER — TRANSCRIBE ORDERS (OUTPATIENT)
Dept: ADMINISTRATIVE | Facility: HOSPITAL | Age: 57
End: 2017-10-28

## 2017-10-28 DIAGNOSIS — R51.9 NONINTRACTABLE HEADACHE, UNSPECIFIED CHRONICITY PATTERN, UNSPECIFIED HEADACHE TYPE: Primary | ICD-10-CM

## 2017-11-02 ENCOUNTER — HOSPITAL ENCOUNTER (OUTPATIENT)
Dept: MRI IMAGING | Facility: HOSPITAL | Age: 57
Discharge: HOME | End: 2017-11-02
Admitting: NURSE PRACTITIONER

## 2017-11-02 DIAGNOSIS — R51.9 NONINTRACTABLE HEADACHE, UNSPECIFIED CHRONICITY PATTERN, UNSPECIFIED HEADACHE TYPE: ICD-10-CM

## 2017-11-02 PROCEDURE — 70551 MRI BRAIN STEM W/O DYE: CPT

## 2017-11-03 ENCOUNTER — TELEPHONE (OUTPATIENT)
Dept: INTERNAL MEDICINE | Facility: CLINIC | Age: 57
End: 2017-11-03

## 2017-11-03 NOTE — TELEPHONE ENCOUNTER
Returned pt's call and per pt's request, I have printed lab orders and mailed them to pt's current mailing address.

## 2017-11-03 NOTE — TELEPHONE ENCOUNTER
PATIENT NEEDS TO  UPCOMING LAB ORDERS IN CHART TO TAKE TO ANOTHER FACILITY, CAN YOU GIVE HER A CALL ONCE THESE ARE READY TO ? [phone]

## 2017-12-01 ENCOUNTER — OFFICE VISIT (OUTPATIENT)
Dept: ENDOCRINOLOGY | Facility: CLINIC | Age: 57
End: 2017-12-01

## 2017-12-01 VITALS
HEIGHT: 64 IN | SYSTOLIC BLOOD PRESSURE: 140 MMHG | WEIGHT: 156 LBS | BODY MASS INDEX: 26.63 KG/M2 | HEART RATE: 78 BPM | OXYGEN SATURATION: 98 % | DIASTOLIC BLOOD PRESSURE: 80 MMHG

## 2017-12-01 DIAGNOSIS — E89.0 POSTABLATIVE HYPOTHYROIDISM: Primary | ICD-10-CM

## 2017-12-01 PROCEDURE — 99213 OFFICE O/P EST LOW 20 MIN: CPT | Performed by: INTERNAL MEDICINE

## 2017-12-01 RX ORDER — KETOCONAZOLE 20 MG/G
CREAM TOPICAL
COMMUNITY
Start: 2017-10-30 | End: 2018-07-26

## 2017-12-01 RX ORDER — PREDNISONE 1 MG/1
TABLET ORAL
COMMUNITY
Start: 2017-11-28 | End: 2018-07-26

## 2017-12-01 RX ORDER — ALENDRONATE SODIUM 70 MG/1
TABLET ORAL
COMMUNITY
Start: 2017-11-24 | End: 2018-04-19

## 2017-12-01 RX ORDER — LEVOTHYROXINE SODIUM 100 UG/1
100 CAPSULE ORAL DAILY
Qty: 30 CAPSULE | Refills: 11 | Status: SHIPPED | OUTPATIENT
Start: 2017-12-01 | End: 2018-04-19

## 2017-12-01 NOTE — PROGRESS NOTES
Subjective:   Hypothyroidism (F/u for hypothyroidism. Pt stated she was recently dx with polymyalgia rheumatica and temporal arteritis)         Follow-up visit for management of Graves' disease and hypothyroidism.       Radioiodine indiced hypothyroidism since 2000. She has a diagnoses of Graves disease at the age of 21.  Underwent PETE tx in 2000.  she started levothyroxine replacement shortly afterwards and reported continuous change in the dose.   She required frequent dose adjustments in the medication.      She was diagnosed with temporal artitis and polymyalgia rheumatica in AUg 2017, started steroid in Sep 2017.   Recent labs reviewed:   TSH was 1.6 with Dr Elise and she reduced the dose to 88 mcg Tirosint. Oct  2017 TSh was1.36  Most recent labs TSH was 9.57.   She is curently taking 17.5 mg prednisone    C/o gaining weight. ALso has jittery sx form prednisone.     Fosamax and calcium/Vit D prescribed by rheumatologist.       Review of Systems  Review of Systems   Constitutional: Positive for fatigue and unexpected weight change (fluctuations in weight). Negative for activity change, appetite change, chills and diaphoresis.   HENT: Negative for congestion, ear pain, facial swelling, hearing loss, postnasal drip, sore throat, trouble swallowing and voice change.    Eyes: Negative.  Negative for redness, itching and visual disturbance.   Respiratory: Negative for cough and shortness of breath.    Cardiovascular: Negative for chest pain, palpitations and leg swelling.   Gastrointestinal: Negative for abdominal distention, abdominal pain, constipation, diarrhea, nausea and vomiting.   Endocrine:        As listed in HPI   Genitourinary: Negative.    Musculoskeletal: Negative for arthralgias, back pain, joint swelling, myalgias, neck pain and neck stiffness.   Skin: Negative.    Allergic/Immunologic: Positive for environmental allergies (gluten sensitivity).   Neurological: Positive for tremors. Negative for  dizziness, syncope, weakness, light-headedness and headaches.   Hematological: Negative.    Psychiatric/Behavioral: The patient is nervous/anxious.    All other systems reviewed and are negative.     Current medications:  Current Outpatient Prescriptions   Medication Sig Dispense Refill   • alendronate (FOSAMAX) 70 MG tablet      • amLODIPine (NORVASC) 5 MG tablet Take  by mouth.     • aspirin 81 MG tablet Take  by mouth Daily.     • famotidine (PEPCID) 20 MG tablet Take 40 mg by mouth.     • Flaxseed, Linseed, (FLAXSEED OIL) 1000 MG capsule      • hydrochlorothiazide (MICROZIDE) 12.5 MG capsule Take  by mouth.     • ketoconazole (NIZORAL) 2 % cream      • KLOR-CON 20 MEQ CR tablet      • Magnesium 400 MG capsule      • Multiple Vitamins-Minerals (MULTIVITAMIN WOMEN 50+) tablet      • olopatadine (PATANOL) 0.1 % ophthalmic solution 1 drop As Needed for Allergies.     • polyethylene glycol (MIRALAX) powder      • predniSONE (DELTASONE) 5 MG tablet      • PREDNISONE PO Take 25 mg by mouth Daily.     • Probiotic Product (ACIDOPHILUS PROBIOTIC BLEND PO)      • TIROSINT 100 MCG capsule Take 1 capsule by mouth Daily. 30 capsule 11   • vitamin d (RA VITAMIN D-3) 5000 UNITS capsule Take  by mouth.       No current facility-administered medications for this visit.        The following portions of the patient's history were reviewed and updated as appropriate: She  has a past medical history of Acid reflux; Anemia; Anxiety; Depression; Graves disease; HTN (hypertension); Hyperlipidemia; Menopause; Migraine; Migraine; Osteopenia; Parathyroid adenoma; Polymyalgia rheumatica; and Thyroid disorder.  She  has a past surgical history that includes  section; Sinus surgery; Cataract extraction; Parathyroidectomy (01/10/2017); and Vitrectomy.  Her family history includes Arthritis in her father and mother; Graves' disease in her father; Hyperlipidemia in her mother; Hypertension in her father and mother; Kidney disease in her  father; Melanoma in her father; No Known Problems in her brother, brother, sister, son, and son; Prostate cancer in her father; Stroke in her mother. There is no history of Breast cancer or Ovarian cancer.  She  reports that she has never smoked. She has never used smokeless tobacco. She reports that she does not drink alcohol or use illicit drugs.  She is allergic to estrogens; statins; and zoloft [sertraline hcl]..      Objective:     Vitals:    12/01/17 1000   BP: 140/80   Pulse: 78   SpO2: 98%   Body mass index is 26.78 kg/(m^2).  Physical Exam   Constitutional: She is oriented to person, place, and time. She appears well-developed and well-nourished.   HENT:   Head: Normocephalic and atraumatic.   Mouth/Throat: Oropharynx is clear and moist.   Eyes: Conjunctivae are normal.   Neck: Normal range of motion. No muscular tenderness present. Carotid bruit is not present. No thyroid mass and no thyromegaly present.   The neck is supple and no assimetry visualized   Cardiovascular: Normal rate, regular rhythm and normal heart sounds.    Pulmonary/Chest: Effort normal and breath sounds normal.   Musculoskeletal: She exhibits no edema.   Lymphadenopathy:     She has no cervical adenopathy.   Neurological: She is alert and oriented to person, place, and time.   Skin: Skin is warm. No rash noted.   Psychiatric: She has a normal mood and affect. Thought content normal.   Vitals reviewed.      LABS AND IMAGING  Results for orders placed or performed during the hospital encounter of 10/18/17   Stress Test With Myocardial Perfusion   Result Value Ref Range    Target HR (85%) 137 bpm    Max. Pred. HR (100%) 161 bpm    BH CV STRESS PROTOCOL 1 Raudel     Stage 1 1     Duration Min Stage 1 3     Duration Sec Stage 1 0     Grade Stage 1 10     Speed Stage 1 1.7     BH CV STRESS METS STAGE 1 5     Stage 2 2     Duration Min Stage 2 3     Duration Sec Stage 2 0     Grade Stage 2 12     Speed Stage 2 2.5     BH CV STRESS METS STAGE 2  7.5     Baseline HR 76 bpm    Baseline /74 mmHg    HR Stage 1 109     BP Stage 1 142/80     O2 Stage 1 97     HR Stage 2 129     BP Stage 2 150/82     O2 Stage 2 98     Stage 3 3     HR Stage 3 155     BP Stage 3 164/82     O2 Stage 3 99     Duration Min Stage 3 3     Duration Sec Stage 3 0     Grade Stage 3 14     Speed Stage 3 3.4     BH CV STRESS METS STAGE 3 10.0     O2 sat rest 98 %    Peak  bpm    Percent Max Pred HR 96.27 %    Percent Target  %    Peak /82 mmHg    O2 sat peak 99 %    Recovery HR 99 bpm    Recovery /74 mmHg    Exercise duration (min) 9 min    Exercise duration (sec) 0 sec    Estimated workload 10.1 METS    Nuc Stress EF 82 %               Assessment:        Problem List Items Addressed This Visit        Endocrine    Postablative hypothyroidism - Primary    Relevant Medications    predniSONE (DELTASONE) 5 MG tablet    TIROSINT 100 MCG capsule    Other Relevant Orders    T4, Free    TSH        Diagnosis was discussed and reviewed with the patient including the advantages of drug therapy.          Plan:        -cont and increase the dose 100 mcg Tirosint, start by taking 88/100 every other day.   -reviewed  labs   and thyroid function is low. Jittery sensation is likely from prednisone.     Follow-up in 4 months

## 2018-02-13 ENCOUNTER — TELEPHONE (OUTPATIENT)
Dept: ENDOCRINOLOGY | Facility: CLINIC | Age: 58
End: 2018-02-13

## 2018-02-13 NOTE — TELEPHONE ENCOUNTER
Pt was informed of results and she has been taking the 100 mcg daily but is not wanting to take the increased dosage because she feels that she cannot tolerate it due to already feeling shaky on the current dose.

## 2018-02-13 NOTE — TELEPHONE ENCOUNTER
----- Message from Elsie BURTON MD sent at 2/9/2018  5:14 PM EST -----  Please call with lab results - thyroid function is still low. Is she taking 100 mcg Tirosint consistently for at least 4 weeks? If she does, then increase the dose to 112 mcg. Send her new rx. If she just switched from 88/100 alternating dose, then take 100 mcg daily

## 2018-03-14 ENCOUNTER — TRANSCRIBE ORDERS (OUTPATIENT)
Dept: ADMINISTRATIVE | Facility: HOSPITAL | Age: 58
End: 2018-03-14

## 2018-03-14 DIAGNOSIS — Z12.31 VISIT FOR SCREENING MAMMOGRAM: Primary | ICD-10-CM

## 2018-04-19 ENCOUNTER — OFFICE VISIT (OUTPATIENT)
Dept: FAMILY MEDICINE CLINIC | Facility: CLINIC | Age: 58
End: 2018-04-19

## 2018-04-19 VITALS
DIASTOLIC BLOOD PRESSURE: 88 MMHG | HEART RATE: 76 BPM | BODY MASS INDEX: 29.88 KG/M2 | WEIGHT: 175 LBS | HEIGHT: 64 IN | SYSTOLIC BLOOD PRESSURE: 144 MMHG | RESPIRATION RATE: 18 BRPM | TEMPERATURE: 97.8 F

## 2018-04-19 DIAGNOSIS — M85.80 OSTEOPENIA, UNSPECIFIED LOCATION: ICD-10-CM

## 2018-04-19 DIAGNOSIS — E78.00 PURE HYPERCHOLESTEROLEMIA: ICD-10-CM

## 2018-04-19 DIAGNOSIS — E89.0 POSTABLATIVE HYPOTHYROIDISM: ICD-10-CM

## 2018-04-19 DIAGNOSIS — I10 ESSENTIAL HYPERTENSION: Primary | ICD-10-CM

## 2018-04-19 DIAGNOSIS — E21.3 HYPERPARATHYROIDISM (HCC): ICD-10-CM

## 2018-04-19 DIAGNOSIS — M79.10 MYALGIA: ICD-10-CM

## 2018-04-19 DIAGNOSIS — E83.42 HYPOMAGNESEMIA: ICD-10-CM

## 2018-04-19 PROBLEM — G43.909 MIGRAINE: Status: ACTIVE | Noted: 2018-04-19

## 2018-04-19 PROBLEM — K21.9 ACID REFLUX: Status: ACTIVE | Noted: 2018-04-19

## 2018-04-19 PROCEDURE — 99203 OFFICE O/P NEW LOW 30 MIN: CPT | Performed by: FAMILY MEDICINE

## 2018-04-19 RX ORDER — ZOLMITRIPTAN 5 MG/1
TABLET, FILM COATED ORAL
COMMUNITY
Start: 2018-03-05 | End: 2019-04-30

## 2018-04-19 RX ORDER — PREDNISONE 1 MG/1
TABLET ORAL
Qty: 60 TABLET | Refills: 2 | Status: SHIPPED | OUTPATIENT
Start: 2018-04-19 | End: 2018-07-26

## 2018-04-19 RX ORDER — LEVOTHYROXINE SODIUM 0.1 MG/1
TABLET ORAL
COMMUNITY
Start: 2018-03-27 | End: 2018-04-25 | Stop reason: DRUGHIGH

## 2018-04-19 NOTE — PROGRESS NOTES
Assessment/Plan       Problems Addressed this Visit        Cardiovascular and Mediastinum    Hypertension - Primary    Relevant Orders    CBC & Differential    Comprehensive Metabolic Panel    Pure hypercholesterolemia    Relevant Orders    Lipid Panel With / Chol / HDL Ratio       Endocrine    Postablative hypothyroidism    Relevant Medications    levothyroxine (SYNTHROID, LEVOTHROID) 100 MCG tablet    predniSONE (DELTASONE) 1 MG tablet    Other Relevant Orders    TSH    T4, Free    Hyperparathyroidism       Musculoskeletal and Integument    Osteopenia      Other Visit Diagnoses     Myalgia        Relevant Medications    predniSONE (DELTASONE) 1 MG tablet    Other Relevant Orders    Comprehensive Metabolic Panel    Sedimentation Rate    C-reactive Protein    Vitamin B12    Magnesium    Hypomagnesemia        Relevant Orders    Magnesium            Follow up: Return if symptoms worsen or fail to improve, for Annual physical and pap. .     DISCUSSION  Multiple issues as outlined above.    Check labs as noted and further plan once we have labs back.    Recommend that she follow-up with Dr. Foster to further assess the pain that she is having that has reoccurred since weaning prednisone.  However, continue to wean prednisone as noted.    Hypothyroidism.  Check labs.    History of hyperlipidemia.  Check fasting lipid profile.    Hypertension is stable but a little bit increased but still on prednisone which could be causing that.  Continue to monitor and continue current medications.    Follow-up for physical and Pap smear.      MEDICATIONS PRESCRIBED  Requested Prescriptions     Signed Prescriptions Disp Refills   • predniSONE (DELTASONE) 1 MG tablet 60 tablet 2     Sig: Use as directed with the 5 mg tab              -------------------------------------------    Subjective     Chief Complaint   Patient presents with   • Establish Care     Physical?          Hypertension   This is a chronic problem. The current episode  started more than 1 year ago. The problem is unchanged. The problem is controlled. Associated symptoms include headaches, malaise/fatigue and palpitations. Pertinent negatives include no chest pain, peripheral edema or shortness of breath. There are no associated agents to hypertension. Current antihypertension treatment includes calcium channel blockers and diuretics. The current treatment provides moderate improvement. There are no compliance problems.  There is no history of angina, kidney disease or CAD/MI. There is no history of chronic renal disease.     Myalgias  Last year dx with Temporal arteritis and PMR  Saw Dr Foster and did not think had TA and not clear if has PMR  Rheum thinks may not have this. Still on prednisone 25 mg last Sept  Very slow taper. Every 2 weeks decrease 1 mg form 10 mg dose  Now on 8 mg dose and having pain in neck and shoulders    Had been started on Elavil as well, since  and stopped this week.  Had some panic feeling when falling asleep.   sensitive to meds, feels better off the med    Dx mod to severe OA of back as well    Pain in mid back as well, burns and squeezes, went to [facility] as well  Has to stop activity.     The head and eye pain may have been migraines headaches, had old  Zomig and helped the headache and eye pain     Had canceled last appt with Dr Foster    + fatigue, dx with hyperparathyroidism and had fatigue, Removed the parathyroid removed    INcrease ALT and AST, had CT scan and labs  Son had similar and had CMV infection. IgG and IgM increased    Last year:  Had pulsing headaches on the right and eye pain and dx with TA and PMR. Started in steroids and no bx. Pain was better with the steroids.   Then would cut back with steroids and pain in eye back.     Younger brother at age 45 + TA and PMR    Hypothyroidism  H/o graves dz and thyroid radioactive ablation  Tired at times.  Can tell if her thyroid is out of whack.  Although now it is difficult because she  "is currently on prednisone.    Possible dx with FM in the past, brain fog, mild depression    Tried gluten free for 1 year. Thyroid still fluctuating,  celiac dz    Osteopenia  Fosamax stopped after jaw pain  Evista tried and had increased hot flashes    B12 elevation causes numbness in legs and arms, had stopped B12 and was better.   Saw NS since neck issue and then saw neurologist and nothing Neurological at that time    Can handle if TSH around 2 and can tell if < 2.0        Past Medical History,Medications, Allergies, and social history was reviewed.      Review of Systems   Constitutional: Positive for fatigue, malaise/fatigue and unexpected weight gain ( On prednisone).   Respiratory: Negative.  Negative for shortness of breath.    Cardiovascular: Positive for palpitations and leg swelling. Negative for chest pain.   Gastrointestinal: Negative.  Positive for GERD. Negative for abdominal distention, blood in stool, constipation, nausea and rectal pain.   Musculoskeletal: Positive for myalgias.   Neurological: Positive for weakness and headaches.   Psychiatric/Behavioral: Positive for sleep disturbance and depressed mood. Negative for suicidal ideas ( Denies active suicidal thoughts). The patient is nervous/anxious.        Objective     Vitals:    04/19/18 1358   BP: 144/88   Pulse: 76   Resp: 18   Temp: 97.8 °F (36.6 °C)   Weight: 79.4 kg (175 lb)   Height: 162.6 cm (64\")          Physical Exam   Constitutional: She is oriented to person, place, and time. She appears well-developed and well-nourished.   HENT:   Head: Normocephalic and atraumatic.   Right Ear: Hearing, tympanic membrane, external ear and ear canal normal.   Left Ear: Hearing, tympanic membrane, external ear and ear canal normal.   Mouth/Throat: Oropharynx is clear and moist.   Eyes: Conjunctivae and EOM are normal. Pupils are equal, round, and reactive to light.   Neck: Normal range of motion. Neck supple. No thyromegaly present. "   Cardiovascular: Normal rate, regular rhythm and normal heart sounds.  Exam reveals no gallop and no friction rub.    No murmur heard.  Pulmonary/Chest: Effort normal and breath sounds normal. No respiratory distress. She has no wheezes. She has no rales.   Abdominal: Soft. Bowel sounds are normal. She exhibits no distension. There is no tenderness. There is no rebound and no guarding.   Musculoskeletal: She exhibits no edema.   Neurological: She is alert and oriented to person, place, and time.   Skin: Skin is warm and dry.   Psychiatric: She has a normal mood and affect. Her behavior is normal.   Jittery from prednisone   Nursing note and vitals reviewed.              Berry Perez MD

## 2018-04-20 ENCOUNTER — RESULTS ENCOUNTER (OUTPATIENT)
Dept: FAMILY MEDICINE CLINIC | Facility: CLINIC | Age: 58
End: 2018-04-20

## 2018-04-20 DIAGNOSIS — I10 ESSENTIAL HYPERTENSION: ICD-10-CM

## 2018-04-20 DIAGNOSIS — M79.10 MYALGIA: ICD-10-CM

## 2018-04-20 DIAGNOSIS — E83.42 HYPOMAGNESEMIA: ICD-10-CM

## 2018-04-20 DIAGNOSIS — E78.00 PURE HYPERCHOLESTEROLEMIA: ICD-10-CM

## 2018-04-20 DIAGNOSIS — E89.0 POSTABLATIVE HYPOTHYROIDISM: ICD-10-CM

## 2018-04-20 LAB
ALBUMIN SERPL-MCNC: 4.1 G/DL (ref 3.2–4.8)
ALBUMIN/GLOB SERPL: 2 G/DL (ref 1.5–2.5)
ALP SERPL-CCNC: 38 U/L (ref 25–100)
ALT SERPL-CCNC: 22 U/L (ref 7–40)
AST SERPL-CCNC: 23 U/L (ref 0–33)
BASOPHILS # BLD AUTO: 0.07 10*3/MM3 (ref 0–0.2)
BASOPHILS NFR BLD AUTO: 1 % (ref 0–1)
BILIRUB SERPL-MCNC: 0.6 MG/DL (ref 0.3–1.2)
BUN SERPL-MCNC: 21 MG/DL (ref 9–23)
BUN/CREAT SERPL: 26.3 (ref 7–25)
CALCIUM SERPL-MCNC: 8.8 MG/DL (ref 8.7–10.4)
CHLORIDE SERPL-SCNC: 107 MMOL/L (ref 99–109)
CHOLEST SERPL-MCNC: 200 MG/DL (ref 0–200)
CHOLEST/HDLC SERPL: 2.67 {RATIO}
CO2 SERPL-SCNC: 30 MMOL/L (ref 20–31)
CREAT SERPL-MCNC: 0.8 MG/DL (ref 0.6–1.3)
CRP SERPL-MCNC: 0.13 MG/DL (ref 0–1)
DIFFERENTIAL COMMENT: NORMAL
EOSINOPHIL # BLD AUTO: 0.09 10*3/MM3 (ref 0–0.3)
EOSINOPHIL NFR BLD AUTO: 1.2 % (ref 0–3)
ERYTHROCYTE [DISTWIDTH] IN BLOOD BY AUTOMATED COUNT: 12.9 % (ref 11.3–14.5)
ERYTHROCYTE [SEDIMENTATION RATE] IN BLOOD BY WESTERGREN METHOD: 3 MM/HR (ref 0–30)
GFR SERPLBLD CREATININE-BSD FMLA CKD-EPI: 73 ML/MIN/1.73
GFR SERPLBLD CREATININE-BSD FMLA CKD-EPI: 89 ML/MIN/1.73
GLOBULIN SER CALC-MCNC: 2.1 GM/DL
GLUCOSE SERPL-MCNC: 81 MG/DL (ref 70–100)
HCT VFR BLD AUTO: 38.7 % (ref 34.5–44)
HDLC SERPL-MCNC: 75 MG/DL (ref 40–60)
HGB BLD-MCNC: 12.5 G/DL (ref 11.5–15.5)
IMM GRANULOCYTES # BLD: 0.01 10*3/MM3 (ref 0–0.03)
IMM GRANULOCYTES NFR BLD: 0.1 % (ref 0–0.6)
LDLC SERPL CALC-MCNC: 106 MG/DL (ref 0–100)
LYMPHOCYTES # BLD AUTO: 4.17 10*3/MM3 (ref 0.6–4.8)
LYMPHOCYTES NFR BLD AUTO: 57 % (ref 24–44)
MAGNESIUM SERPL-MCNC: 2.3 MG/DL (ref 1.3–2.7)
MCH RBC QN AUTO: 31.5 PG (ref 27–31)
MCHC RBC AUTO-ENTMCNC: 32.3 G/DL (ref 32–36)
MCV RBC AUTO: 97.5 FL (ref 80–99)
MONOCYTES # BLD AUTO: 0.41 10*3/MM3 (ref 0–1)
MONOCYTES NFR BLD AUTO: 5.6 % (ref 0–12)
NEUTROPHILS # BLD AUTO: 2.56 10*3/MM3 (ref 1.5–8.3)
NEUTROPHILS NFR BLD AUTO: 35.1 % (ref 41–71)
PLATELET # BLD AUTO: 239 10*3/MM3 (ref 150–450)
PLATELET BLD QL SMEAR: NORMAL
POTASSIUM SERPL-SCNC: 4 MMOL/L (ref 3.5–5.5)
PROT SERPL-MCNC: 6.2 G/DL (ref 5.7–8.2)
RBC # BLD AUTO: 3.97 10*6/MM3 (ref 3.89–5.14)
RBC MORPH BLD: NORMAL
SODIUM SERPL-SCNC: 144 MMOL/L (ref 132–146)
T4 FREE SERPL-MCNC: 1.38 NG/DL (ref 0.89–1.76)
TRIGL SERPL-MCNC: 97 MG/DL (ref 0–150)
TSH SERPL DL<=0.005 MIU/L-ACNC: 9.92 MIU/ML (ref 0.35–5.35)
VIT B12 SERPL-MCNC: 540 PG/ML (ref 211–911)
VLDLC SERPL CALC-MCNC: 19.4 MG/DL
WBC # BLD AUTO: 7.31 10*3/MM3 (ref 3.5–10.8)

## 2018-04-25 ENCOUNTER — OFFICE VISIT (OUTPATIENT)
Dept: FAMILY MEDICINE CLINIC | Facility: CLINIC | Age: 58
End: 2018-04-25

## 2018-04-25 ENCOUNTER — HOSPITAL ENCOUNTER (OUTPATIENT)
Dept: MAMMOGRAPHY | Facility: HOSPITAL | Age: 58
Discharge: HOME | End: 2018-04-25
Admitting: FAMILY MEDICINE

## 2018-04-25 VITALS
WEIGHT: 177 LBS | RESPIRATION RATE: 16 BRPM | SYSTOLIC BLOOD PRESSURE: 140 MMHG | HEIGHT: 64 IN | TEMPERATURE: 97.8 F | HEART RATE: 76 BPM | BODY MASS INDEX: 30.22 KG/M2 | DIASTOLIC BLOOD PRESSURE: 80 MMHG

## 2018-04-25 DIAGNOSIS — E21.3 HYPERPARATHYROIDISM (HCC): ICD-10-CM

## 2018-04-25 DIAGNOSIS — Z12.31 VISIT FOR SCREENING MAMMOGRAM: ICD-10-CM

## 2018-04-25 DIAGNOSIS — Z00.00 WELL ADULT EXAM: Primary | ICD-10-CM

## 2018-04-25 DIAGNOSIS — M85.80 OSTEOPENIA, UNSPECIFIED LOCATION: ICD-10-CM

## 2018-04-25 DIAGNOSIS — Z23 IMMUNIZATION DUE: ICD-10-CM

## 2018-04-25 PROCEDURE — 77067 SCR MAMMO BI INCL CAD: CPT

## 2018-04-25 PROCEDURE — 77063 BREAST TOMOSYNTHESIS BI: CPT

## 2018-04-25 PROCEDURE — 77067 SCR MAMMO BI INCL CAD: CPT | Performed by: RADIOLOGY

## 2018-04-25 PROCEDURE — 99396 PREV VISIT EST AGE 40-64: CPT | Performed by: NURSE PRACTITIONER

## 2018-04-25 PROCEDURE — 77063 BREAST TOMOSYNTHESIS BI: CPT | Performed by: RADIOLOGY

## 2018-04-25 RX ORDER — LEVOTHYROXINE SODIUM 112 UG/1
112 TABLET ORAL DAILY
Qty: 30 TABLET | Refills: 3 | Status: SHIPPED | OUTPATIENT
Start: 2018-04-25 | End: 2018-07-26 | Stop reason: SDUPTHER

## 2018-04-25 NOTE — PROGRESS NOTES
Subjective   Carissa Medina is a 58 y.o. female.     History of Present Illness   Annual physical exam  Due for pap/pelvic  Weight gain due to Prednisone  Had mammogram today  Due for pneumovax and Shingles vaccine  Health eating, no regular exercise  Up to date on eye exam and dental    The following portions of the patient's history were reviewed and updated as appropriate: allergies, current medications, past family history, past medical history, past social history, past surgical history and problem list.    Review of Systems   Constitutional: Negative.    HENT: Negative.    Eyes: Negative.    Respiratory: Negative.    Cardiovascular: Negative.    Gastrointestinal: Negative.    Endocrine: Negative.    Genitourinary: Negative.    Musculoskeletal: Positive for back pain.   Skin: Negative.    Allergic/Immunologic: Negative.    Neurological: Negative.    Hematological: Negative.    Psychiatric/Behavioral: Negative.    All other systems reviewed and are negative.      Objective   Physical Exam   Constitutional: She is oriented to person, place, and time. She appears well-developed and well-nourished. No distress.   HENT:   Head: Normocephalic.   Right Ear: External ear normal.   Left Ear: External ear normal.   Nose: Nose normal.   Mouth/Throat: Oropharynx is clear and moist.   Neck: Normal range of motion. Neck supple. No thyromegaly present.   Cardiovascular: Normal rate, regular rhythm, normal heart sounds and intact distal pulses.  Exam reveals no gallop and no friction rub.    No murmur heard.  Pulmonary/Chest: Effort normal and breath sounds normal. No respiratory distress. She has no wheezes. She has no rales.   Abdominal: Soft. Bowel sounds are normal.   Genitourinary: Vagina normal, uterus normal and cervix normal. Right adnexum displays no mass, no tenderness and no fullness. Left adnexum displays no mass, no tenderness and no fullness.   Musculoskeletal: Normal range of motion. She exhibits no edema.    Lymphadenopathy:     She has no cervical adenopathy.   Neurological: She is alert and oriented to person, place, and time. She has normal reflexes.   Skin: Skin is warm and dry.   Psychiatric: She has a normal mood and affect. Her behavior is normal. Judgment and thought content normal.   Nursing note and vitals reviewed.        Assessment/Plan   Carissa was seen today for annual physical & pap.    Diagnoses and all orders for this visit:    Well adult exam    Hyperparathyroidism    Osteopenia, unspecified location    Immunization due  -     pneumococcal conj. 13-valent (PREVNAR-13) vaccine 0.5 mL; Inject 0.5 mL into the shoulder, thigh, or buttocks 1 (One) Time.    Health advise: healthy food choices with fresh fruits and vegetables, maintain sleep pattern at least 8 hours, avoid texting and distracted driving practices; wear safety belt, engage in regular exercise, maintain healthy weight, use safe sex practices, avoid alcohol and illicit drugs. Maintain immunizations that are up to date. Maintain health maintenance mammogram, Pap, DEXA etc.  Follow up with PCP if struggling with depression or anxiety. Keep regular dental and eye exams. Brush and floss teeth daily.   F/U annually and prn.     Will update Prevnar today. DEXA due in Sept 2018. Shingles vaccine at local pharmacy.

## 2018-07-24 DIAGNOSIS — E03.9 HYPOTHYROIDISM, UNSPECIFIED TYPE: Primary | ICD-10-CM

## 2018-07-24 LAB — TSH SERPL DL<=0.005 MIU/L-ACNC: 0.24 MIU/ML (ref 0.35–5.35)

## 2018-07-26 ENCOUNTER — OFFICE VISIT (OUTPATIENT)
Dept: FAMILY MEDICINE CLINIC | Facility: CLINIC | Age: 58
End: 2018-07-26

## 2018-07-26 VITALS
HEIGHT: 64 IN | WEIGHT: 173 LBS | HEART RATE: 88 BPM | DIASTOLIC BLOOD PRESSURE: 82 MMHG | SYSTOLIC BLOOD PRESSURE: 118 MMHG | RESPIRATION RATE: 20 BRPM | TEMPERATURE: 98.6 F | BODY MASS INDEX: 29.53 KG/M2

## 2018-07-26 DIAGNOSIS — I10 ESSENTIAL HYPERTENSION: ICD-10-CM

## 2018-07-26 DIAGNOSIS — M79.10 MYALGIA: Primary | ICD-10-CM

## 2018-07-26 DIAGNOSIS — K21.9 GASTROESOPHAGEAL REFLUX DISEASE, ESOPHAGITIS PRESENCE NOT SPECIFIED: ICD-10-CM

## 2018-07-26 DIAGNOSIS — E89.0 POSTABLATIVE HYPOTHYROIDISM: ICD-10-CM

## 2018-07-26 PROCEDURE — 99214 OFFICE O/P EST MOD 30 MIN: CPT | Performed by: FAMILY MEDICINE

## 2018-07-26 RX ORDER — POTASSIUM CHLORIDE 20 MEQ/1
20 TABLET, EXTENDED RELEASE ORAL DAILY
Qty: 30 TABLET | Refills: 5 | Status: SHIPPED | OUTPATIENT
Start: 2018-07-26 | End: 2019-01-22 | Stop reason: SDUPTHER

## 2018-07-26 RX ORDER — RANITIDINE 150 MG/1
150 TABLET ORAL NIGHTLY
COMMUNITY
End: 2019-04-30

## 2018-07-26 RX ORDER — PHENOL 1.4 %
600 AEROSOL, SPRAY (ML) MUCOUS MEMBRANE DAILY
COMMUNITY
End: 2019-04-30

## 2018-07-26 RX ORDER — AMLODIPINE BESYLATE 5 MG/1
5 TABLET ORAL DAILY
Qty: 30 TABLET | Refills: 5 | Status: SHIPPED | OUTPATIENT
Start: 2018-07-26 | End: 2019-01-22 | Stop reason: SDUPTHER

## 2018-07-26 RX ORDER — NAPROXEN SODIUM 220 MG
220 TABLET ORAL 2 TIMES DAILY PRN
COMMUNITY
End: 2019-04-30

## 2018-07-26 RX ORDER — LEVOTHYROXINE SODIUM 0.1 MG/1
100 TABLET ORAL DAILY
Qty: 30 TABLET | Refills: 2 | Status: SHIPPED | OUTPATIENT
Start: 2018-07-26 | End: 2019-01-25 | Stop reason: SDUPTHER

## 2018-07-26 RX ORDER — VIT C/B6/B5/MAGNESIUM/HERB 173 50-5-6-5MG
CAPSULE ORAL
COMMUNITY
End: 2019-04-30

## 2018-07-26 RX ORDER — LEVOTHYROXINE SODIUM 112 UG/1
112 TABLET ORAL DAILY
Qty: 30 TABLET | Refills: 3 | Status: SHIPPED | OUTPATIENT
Start: 2018-07-26 | End: 2019-01-25

## 2018-07-26 RX ORDER — HYDROCHLOROTHIAZIDE 12.5 MG/1
12.5 CAPSULE, GELATIN COATED ORAL EVERY MORNING
Qty: 30 CAPSULE | Refills: 5 | Status: SHIPPED | OUTPATIENT
Start: 2018-07-26 | End: 2019-01-22 | Stop reason: SDUPTHER

## 2018-07-26 NOTE — PROGRESS NOTES
Assessment/Plan       Problems Addressed this Visit        Cardiovascular and Mediastinum    Hypertension    Relevant Medications    amLODIPine (NORVASC) 5 MG tablet    hydrochlorothiazide (MICROZIDE) 12.5 MG capsule    potassium chloride (KLOR-CON) 20 MEQ CR tablet       Digestive    Acid reflux    Relevant Medications    raNITIdine (ZANTAC) 150 MG tablet       Endocrine    Postablative hypothyroidism    Relevant Medications    levothyroxine (SYNTHROID) 112 MCG tablet    levothyroxine (SYNTHROID) 100 MCG tablet      Other Visit Diagnoses     Myalgia    -  Primary            Follow up: Return in about 6 months (around 1/26/2019), or if symptoms worsen or fail to improve.     DISCUSSION  Still having some symptoms from coming off the prednisone.  This could be causing some of her discomfort.  She will give it another few weeks and call if not improving.    Hypothyroidism.  TSH shows overactive.  Has had problems in the past with 100 µg not being enough and 112 being too much so we will try every other day and alternating 100 with 112 µg of levothyroxine.  Recheck level in 3 months.    Hypertension.  Blood pressure is good today.  She has symptoms if it is too low but we will continue to monitor.  No change in dose at this time.    Follow-up and recheck in 6 months or sooner if having problems.      MEDICATIONS PRESCRIBED  Requested Prescriptions     Signed Prescriptions Disp Refills   • levothyroxine (SYNTHROID) 112 MCG tablet 30 tablet 3     Sig: Take 1 tablet by mouth Daily. Take alternating with 100 mcg   • levothyroxine (SYNTHROID) 100 MCG tablet 30 tablet 2     Sig: Take 1 tablet by mouth Daily. Take alternating with 112 mcg   • amLODIPine (NORVASC) 5 MG tablet 30 tablet 5     Sig: Take 1 tablet by mouth Daily.   • hydrochlorothiazide (MICROZIDE) 12.5 MG capsule 30 capsule 5     Sig: Take 1 capsule by mouth Every Morning.   • potassium chloride (KLOR-CON) 20 MEQ CR tablet 30 tablet 5     Sig: Take 1 tablet by  mouth Daily.            -------------------------------------------    Subjective     Chief Complaint   Patient presents with   • Hypothyroidism     F/U, refills   • Hypertension     F/U, refills         Hypertension   This is a chronic problem. The current episode started today. The problem is unchanged. The problem is controlled. Associated symptoms include malaise/fatigue. Pertinent negatives include no chest pain, peripheral edema or shortness of breath. There are no associated agents to hypertension. Current antihypertension treatment includes calcium channel blockers and diuretics. The current treatment provides moderate improvement. There are no compliance problems.  There is no history of angina, kidney disease or CAD/MI. There is no history of chronic renal disease.       Shoulder pain and myalgia    Was dx PMR, saw Dr Foster and did not think was temporal arteritis. Zomig helps headaches  Not enough evidence of PMR and was weaned off the steroids slowly  Since then ++ fatigue, muscle and joint pain   Hurts all over  Feels like BP is low. Lightheaded in 120s  Sugar drops  Had some swelling in ankles an hands  Hair falling out  Had been on prednisone for 9 months   last steroid 6 weeks      After 3 weeks, felt some better  More active now    But current sx: shoulder and neck pain , hard to get arms above head, hip pain, hard to get up from a chair.  Does not want to go back to on the steroids. Sees Dr Foster in Oct again.       Hypothyroidism  112 is too much in the past and 100 is not enough  Takes in am, wait in a hour before taking anything else or eating anything else  Has tried to alternate with 100 with 112  Brand name did not make a difference      GERD  Increased reflux now  Had some scarring in the past  Ranitidine has been helping now    Had one parathyroid left        History   Smoking Status   • Never Smoker   Smokeless Tobacco   • Never Used        Past Medical History,Medications, Allergies, and  "social history was reviewed.      Review of Systems   Constitutional: Positive for fatigue and malaise/fatigue.   HENT: Negative.    Respiratory: Negative.  Negative for shortness of breath.    Cardiovascular: Negative.  Negative for chest pain.   Gastrointestinal: Negative.    Musculoskeletal: Positive for arthralgias and myalgias.   Psychiatric/Behavioral: Negative.        Objective     Vitals:    07/26/18 1011   BP: 118/82   Pulse: 88   Resp: 20   Temp: 98.6 °F (37 °C)   Weight: 78.5 kg (173 lb)   Height: 162.6 cm (64\")          Physical Exam   Constitutional: She is oriented to person, place, and time. She appears well-developed and well-nourished.   HENT:   Head: Normocephalic and atraumatic.   Right Ear: Hearing, tympanic membrane, external ear and ear canal normal.   Left Ear: Hearing, tympanic membrane, external ear and ear canal normal.   Mouth/Throat: Oropharynx is clear and moist.   Eyes: Pupils are equal, round, and reactive to light. Conjunctivae and EOM are normal.   Neck: Normal range of motion. Neck supple. No thyromegaly present.   Cardiovascular: Normal rate, regular rhythm and normal heart sounds.  Exam reveals no gallop and no friction rub.    No murmur heard.  Pulmonary/Chest: Effort normal and breath sounds normal. No respiratory distress. She has no wheezes. She has no rales.   Musculoskeletal: She exhibits no edema.   Tenderness along the posterior shoulders   Neurological: She is alert and oriented to person, place, and time.   Skin: Skin is warm and dry.   Psychiatric: She has a normal mood and affect.   Nursing note and vitals reviewed.                Berry Perez MD    "

## 2018-10-18 ENCOUNTER — FLU SHOT (OUTPATIENT)
Dept: FAMILY MEDICINE CLINIC | Facility: CLINIC | Age: 58
End: 2018-10-18

## 2018-10-18 DIAGNOSIS — Z23 FLU VACCINE NEED: ICD-10-CM

## 2018-10-18 PROCEDURE — 90674 CCIIV4 VAC NO PRSV 0.5 ML IM: CPT | Performed by: FAMILY MEDICINE

## 2018-10-18 PROCEDURE — 90471 IMMUNIZATION ADMIN: CPT | Performed by: FAMILY MEDICINE

## 2018-11-09 ENCOUNTER — TELEPHONE (OUTPATIENT)
Dept: FAMILY MEDICINE CLINIC | Facility: CLINIC | Age: 58
End: 2018-11-09

## 2018-11-09 ENCOUNTER — RESULTS ENCOUNTER (OUTPATIENT)
Dept: FAMILY MEDICINE CLINIC | Facility: CLINIC | Age: 58
End: 2018-11-09

## 2018-11-09 DIAGNOSIS — E89.0 POSTABLATIVE HYPOTHYROIDISM: Primary | ICD-10-CM

## 2018-11-09 DIAGNOSIS — E89.0 POSTABLATIVE HYPOTHYROIDISM: ICD-10-CM

## 2018-11-09 NOTE — TELEPHONE ENCOUNTER
----- Message from Harsh Morfin sent at 11/9/2018 10:31 AM EST -----  Pt wanted to see if the orders for bloodwork could go ahead and be put in so she can have her thyroid checked. [phone]

## 2018-11-12 DIAGNOSIS — M79.7 FIBROMYALGIA: ICD-10-CM

## 2018-11-12 DIAGNOSIS — M15.0 PRIMARY GENERALIZED (OSTEO)ARTHRITIS: Primary | ICD-10-CM

## 2018-11-12 DIAGNOSIS — M85.50 ANEURYSMAL BONE CYST, UNSPECIFIED SITE: ICD-10-CM

## 2018-11-12 DIAGNOSIS — M25.50 PAIN IN JOINT, MULTIPLE SITES: ICD-10-CM

## 2018-11-13 LAB
ALBUMIN SERPL-MCNC: 4.62 G/DL (ref 3.2–4.8)
ALBUMIN/GLOB SERPL: 2.1 G/DL (ref 1.5–2.5)
ALP SERPL-CCNC: 63 U/L (ref 25–100)
ALT SERPL-CCNC: 25 U/L (ref 7–40)
AST SERPL-CCNC: 28 U/L (ref 0–33)
BASOPHILS # BLD AUTO: 0.08 10*3/MM3 (ref 0–0.2)
BASOPHILS NFR BLD AUTO: 1 % (ref 0–1)
BILIRUB SERPL-MCNC: 0.7 MG/DL (ref 0.3–1.2)
BUN SERPL-MCNC: 15 MG/DL (ref 9–23)
BUN/CREAT SERPL: 21.4 (ref 7–25)
CALCIUM SERPL-MCNC: 9.5 MG/DL (ref 8.7–10.4)
CHLORIDE SERPL-SCNC: 104 MMOL/L (ref 99–109)
CO2 SERPL-SCNC: 29 MMOL/L (ref 20–31)
CREAT SERPL-MCNC: 0.7 MG/DL (ref 0.6–1.3)
CRP SERPL-MCNC: 0.18 MG/DL (ref 0–1)
EOSINOPHIL # BLD AUTO: 0.16 10*3/MM3 (ref 0–0.3)
EOSINOPHIL NFR BLD AUTO: 2 % (ref 0–3)
ERYTHROCYTE [DISTWIDTH] IN BLOOD BY AUTOMATED COUNT: 12.9 % (ref 11.3–14.5)
ERYTHROCYTE [SEDIMENTATION RATE] IN BLOOD BY WESTERGREN METHOD: 7 MM/HR (ref 0–30)
GLOBULIN SER CALC-MCNC: 2.2 GM/DL
GLUCOSE SERPL-MCNC: 85 MG/DL (ref 70–100)
HCT VFR BLD AUTO: 40.5 % (ref 34.5–44)
HGB BLD-MCNC: 12.9 G/DL (ref 11.5–15.5)
IMM GRANULOCYTES # BLD: 0.01 10*3/MM3 (ref 0–0.03)
IMM GRANULOCYTES NFR BLD: 0.1 % (ref 0–0.6)
LYMPHOCYTES # BLD AUTO: 3.14 10*3/MM3 (ref 0.6–4.8)
LYMPHOCYTES NFR BLD AUTO: 39.3 % (ref 24–44)
MCH RBC QN AUTO: 30 PG (ref 27–31)
MCHC RBC AUTO-ENTMCNC: 31.9 G/DL (ref 32–36)
MCV RBC AUTO: 94.2 FL (ref 80–99)
MONOCYTES # BLD AUTO: 0.49 10*3/MM3 (ref 0–1)
MONOCYTES NFR BLD AUTO: 6.1 % (ref 0–12)
NEUTROPHILS # BLD AUTO: 4.12 10*3/MM3 (ref 1.5–8.3)
NEUTROPHILS NFR BLD AUTO: 51.5 % (ref 41–71)
PLATELET # BLD AUTO: 233 10*3/MM3 (ref 150–450)
POTASSIUM SERPL-SCNC: 3.8 MMOL/L (ref 3.5–5.5)
PROT SERPL-MCNC: 6.8 G/DL (ref 5.7–8.2)
RBC # BLD AUTO: 4.3 10*6/MM3 (ref 3.89–5.14)
SODIUM SERPL-SCNC: 141 MMOL/L (ref 132–146)
T4 FREE SERPL-MCNC: 1.44 NG/DL (ref 0.89–1.76)
TSH SERPL DL<=0.005 MIU/L-ACNC: 1.3 MIU/ML (ref 0.35–5.35)
WBC # BLD AUTO: 8 10*3/MM3 (ref 3.5–10.8)

## 2019-01-22 ENCOUNTER — OFFICE VISIT (OUTPATIENT)
Dept: FAMILY MEDICINE CLINIC | Facility: CLINIC | Age: 59
End: 2019-01-22

## 2019-01-22 VITALS
HEART RATE: 78 BPM | DIASTOLIC BLOOD PRESSURE: 88 MMHG | HEIGHT: 64 IN | BODY MASS INDEX: 29.02 KG/M2 | RESPIRATION RATE: 18 BRPM | SYSTOLIC BLOOD PRESSURE: 120 MMHG | TEMPERATURE: 97.3 F | WEIGHT: 170 LBS

## 2019-01-22 DIAGNOSIS — M15.0 PRIMARY GENERALIZED (OSTEO)ARTHRITIS: Primary | ICD-10-CM

## 2019-01-22 DIAGNOSIS — I10 ESSENTIAL HYPERTENSION: ICD-10-CM

## 2019-01-22 DIAGNOSIS — M79.10 MYALGIA: ICD-10-CM

## 2019-01-22 DIAGNOSIS — E89.0 POSTABLATIVE HYPOTHYROIDISM: ICD-10-CM

## 2019-01-22 DIAGNOSIS — M25.50 PAIN IN JOINT, MULTIPLE SITES: ICD-10-CM

## 2019-01-22 PROCEDURE — 99214 OFFICE O/P EST MOD 30 MIN: CPT | Performed by: FAMILY MEDICINE

## 2019-01-22 RX ORDER — FAMOTIDINE 20 MG/1
20 TABLET, FILM COATED ORAL DAILY
COMMUNITY

## 2019-01-22 RX ORDER — HYDROCHLOROTHIAZIDE 12.5 MG/1
12.5 CAPSULE, GELATIN COATED ORAL EVERY MORNING
Qty: 30 CAPSULE | Refills: 5 | Status: SHIPPED | OUTPATIENT
Start: 2019-01-22 | End: 2019-07-20 | Stop reason: SDUPTHER

## 2019-01-22 RX ORDER — POTASSIUM CHLORIDE 20 MEQ/1
20 TABLET, EXTENDED RELEASE ORAL DAILY
Qty: 30 TABLET | Refills: 5 | Status: SHIPPED | OUTPATIENT
Start: 2019-01-22 | End: 2019-07-20 | Stop reason: SDUPTHER

## 2019-01-22 RX ORDER — AMLODIPINE BESYLATE 5 MG/1
5 TABLET ORAL DAILY
Qty: 30 TABLET | Refills: 5 | Status: SHIPPED | OUTPATIENT
Start: 2019-01-22 | End: 2019-07-20 | Stop reason: SDUPTHER

## 2019-01-22 RX ORDER — CARBOXYMETHYLCELLULOSE SODIUM 5 MG/ML
SOLUTION/ DROPS OPHTHALMIC
COMMUNITY

## 2019-01-22 NOTE — PROGRESS NOTES
Assessment/Plan       Problems Addressed this Visit        Cardiovascular and Mediastinum    Hypertension    Relevant Medications    amLODIPine (NORVASC) 5 MG tablet    hydrochlorothiazide (MICROZIDE) 12.5 MG capsule    potassium chloride (KLOR-CON) 20 MEQ CR tablet       Endocrine    Postablative hypothyroidism    Relevant Orders    TSH    T4, Free      Other Visit Diagnoses     Primary generalized (osteo)arthritis    -  Primary    Relevant Orders    CBC & Differential    Comprehensive Metabolic Panel    Pain in joint, multiple sites        Relevant Orders    Sedimentation Rate    C-reactive Protein    JEFFY by IFA, Reflex 9-biomarkers profile    Myalgia        Relevant Orders    Sedimentation Rate    C-reactive Protein    JEFFY by IFA, Reflex 9-biomarkers profile            Follow up: Return for follow up depends on review of labs and testing.     DISCUSSION  Generalized arthritis with myalgias and joint pain.  Recheck labs as noted.    Hypertension.  Stable.  Refilled medications.  Check CMP.    Hypothyroidism.  Recheck TSH and free T4.    Further plan once we have labs back.      MEDICATIONS PRESCRIBED  Requested Prescriptions     Signed Prescriptions Disp Refills   • amLODIPine (NORVASC) 5 MG tablet 30 tablet 5     Sig: Take 1 tablet by mouth Daily.   • hydrochlorothiazide (MICROZIDE) 12.5 MG capsule 30 capsule 5     Sig: Take 1 capsule by mouth Every Morning.   • potassium chloride (KLOR-CON) 20 MEQ CR tablet 30 tablet 5     Sig: Take 1 tablet by mouth Daily.              -------------------------------------------    Subjective     Chief Complaint   Patient presents with   • Hypothyroidism     6 month f/u    • Hypertension         Hypertension   This is a chronic problem. The current episode started today. The problem is unchanged. The problem is controlled. Associated symptoms include malaise/fatigue. Pertinent negatives include no chest pain, peripheral edema or shortness of breath. There are no associated  agents to hypertension. Current antihypertension treatment includes calcium channel blockers and diuretics. The current treatment provides moderate improvement. There are no compliance problems.  There is no history of angina, kidney disease or CAD/MI. There is no history of chronic renal disease.   Hypothyroidism   This is a chronic problem. The current episode started more than 1 year ago. The problem occurs daily. The problem has been unchanged. Associated symptoms include arthralgias and fatigue. Pertinent negatives include no chest pain. Treatments tried: levothyroixine 100 daily alt 112.       Had been on prednisone for 9 months and off in June  Decided not PMR and stopped it in June  Then extreme joint and muscle pain  Saw acupuncturist in [place] and was helpful at 1st, felt much better    Feel like thyroid med is stable now  In Nov TSH was good. 112 Three times per week and then 100 4 times per week  Now: 112 2 times per week and 100 5 times per week  Feel like increased appetite, agitated and cry easier  Would like to check that again    Last month or two, acupuncture not as helpful now    Pain in muscle and joints, increased  Fatigue  lightheaded a lot  Skin rashes and bumps, may itch, may be scaly  May be raised  Itch all over , increased at night  More sensitive to the sun ( leg rash)    Saw Dr Foster in Nov 2018 but acupuncture was still helping    Had fallen recently, took ibuprofen 800 and helped some  Then 400 mg am and 600mg at night, started having lightheaded and sweaty and may have been SE of that and stopped and that helped    Tylenol not helping as much (1000 mg BID) and then hard time sleeping so stopped and sleeping better    Now on Aleve 1 in am or 1 in pm    Hurt all over and joints hurt and muscle pain         Social History     Tobacco Use   Smoking Status Never Smoker   Smokeless Tobacco Never Used        Past Medical History,Medications, Allergies, and social history was  "reviewed.      Review of Systems   Constitutional: Positive for fatigue and malaise/fatigue.   HENT: Negative.    Respiratory: Negative.  Negative for shortness of breath.    Cardiovascular: Negative.  Negative for chest pain.   Gastrointestinal: Negative.    Musculoskeletal: Positive for arthralgias.   Skin: Positive for skin lesions.   Neurological: Positive for light-headedness.   Psychiatric/Behavioral: Negative.        Objective     Vitals:    01/22/19 1142   BP: 120/88   Pulse: 78   Resp: 18   Temp: 97.3 °F (36.3 °C)   Weight: 77.1 kg (170 lb)   Height: 162.6 cm (64\")          Physical Exam   Constitutional: She appears well-developed and well-nourished.   HENT:   Head: Normocephalic and atraumatic.   Right Ear: Hearing, tympanic membrane, external ear and ear canal normal.   Left Ear: Hearing, tympanic membrane, external ear and ear canal normal.   Mouth/Throat: Oropharynx is clear and moist.   Eyes: Conjunctivae and EOM are normal. Pupils are equal, round, and reactive to light.   Neck: Normal range of motion. Neck supple. No thyromegaly present.   Cardiovascular: Normal rate, regular rhythm and normal heart sounds. Exam reveals no gallop and no friction rub.   No murmur heard.  Pulmonary/Chest: Effort normal and breath sounds normal. No respiratory distress. She has no wheezes. She has no rales.   Abdominal: Soft. Bowel sounds are normal. She exhibits no distension. There is no tenderness. There is no rebound and no guarding.   Musculoskeletal: She exhibits no edema.   Gen. tenderness of the muscles and pain with range of motion of shoulders and knees.   Neurological: She is alert.   Skin: Skin is warm and dry.   Psychiatric: She has a normal mood and affect.   Nursing note and vitals reviewed.                Berry Perez MD    "

## 2019-01-24 LAB
ALBUMIN SERPL-MCNC: 4.83 G/DL (ref 3.2–4.8)
ALBUMIN/GLOB SERPL: 2.1 G/DL (ref 1.5–2.5)
ALP SERPL-CCNC: 70 U/L (ref 25–100)
ALT SERPL-CCNC: 38 U/L (ref 7–40)
ANA HOMOGEN TITR SER: ABNORMAL {TITER}
ANA SPECKLED TITR SER: ABNORMAL {TITER}
ANA TITR SER IF: POSITIVE {TITER}
AST SERPL-CCNC: 30 U/L (ref 0–33)
BASOPHILS # BLD AUTO: 0.06 10*3/MM3 (ref 0–0.2)
BASOPHILS NFR BLD AUTO: 0.8 % (ref 0–1)
BILIRUB SERPL-MCNC: 0.6 MG/DL (ref 0.3–1.2)
BUN SERPL-MCNC: 19 MG/DL (ref 9–23)
BUN/CREAT SERPL: 28.4 (ref 7–25)
CALCIUM SERPL-MCNC: 9.8 MG/DL (ref 8.7–10.4)
CENTROMERE B AB SER-ACNC: <0.2 AI (ref 0–0.9)
CHLORIDE SERPL-SCNC: 106 MMOL/L (ref 99–109)
CHROMATIN AB SERPL-ACNC: <0.2 AI (ref 0–0.9)
CO2 SERPL-SCNC: 28 MMOL/L (ref 20–31)
CREAT SERPL-MCNC: 0.67 MG/DL (ref 0.6–1.3)
CRP SERPL-MCNC: 0.09 MG/DL (ref 0–1)
DSDNA AB SER-ACNC: <1 IU/ML (ref 0–9)
ENA JO1 AB SER-ACNC: <0.2 AI (ref 0–0.9)
ENA RNP AB SER-ACNC: <0.2 AI (ref 0–0.9)
ENA SCL70 AB SER-ACNC: <0.2 AI (ref 0–0.9)
ENA SM AB SER-ACNC: <0.2 AI (ref 0–0.9)
ENA SS-A AB SER-ACNC: 0.2 AI (ref 0–0.9)
ENA SS-B AB SER-ACNC: <0.2 AI (ref 0–0.9)
EOSINOPHIL # BLD AUTO: 0.17 10*3/MM3 (ref 0–0.3)
EOSINOPHIL NFR BLD AUTO: 2.2 % (ref 0–3)
ERYTHROCYTE [DISTWIDTH] IN BLOOD BY AUTOMATED COUNT: 12.5 % (ref 11.3–14.5)
ERYTHROCYTE [SEDIMENTATION RATE] IN BLOOD BY WESTERGREN METHOD: 10 MM/HR (ref 0–30)
GLOBULIN SER CALC-MCNC: 2.3 GM/DL
GLUCOSE SERPL-MCNC: 95 MG/DL (ref 70–100)
HCT VFR BLD AUTO: 40.9 % (ref 34.5–44)
HGB BLD-MCNC: 13.4 G/DL (ref 11.5–15.5)
IMM GRANULOCYTES # BLD AUTO: 0.01 10*3/MM3 (ref 0–0.03)
IMM GRANULOCYTES NFR BLD AUTO: 0.1 % (ref 0–0.6)
LABORATORY COMMENT REPORT: ABNORMAL
LYMPHOCYTES # BLD AUTO: 3.12 10*3/MM3 (ref 0.6–4.8)
LYMPHOCYTES NFR BLD AUTO: 39.9 % (ref 24–44)
Lab: ABNORMAL
Lab: ABNORMAL
MCH RBC QN AUTO: 30.5 PG (ref 27–31)
MCHC RBC AUTO-ENTMCNC: 32.8 G/DL (ref 32–36)
MCV RBC AUTO: 93.2 FL (ref 80–99)
MONOCYTES # BLD AUTO: 0.49 10*3/MM3 (ref 0–1)
MONOCYTES NFR BLD AUTO: 6.3 % (ref 0–12)
NEUTROPHILS # BLD AUTO: 3.96 10*3/MM3 (ref 1.5–8.3)
NEUTROPHILS NFR BLD AUTO: 50.7 % (ref 41–71)
PLATELET # BLD AUTO: 320 10*3/MM3 (ref 150–450)
POTASSIUM SERPL-SCNC: 3.9 MMOL/L (ref 3.5–5.5)
PROT SERPL-MCNC: 7.1 G/DL (ref 5.7–8.2)
RBC # BLD AUTO: 4.39 10*6/MM3 (ref 3.89–5.14)
SODIUM SERPL-SCNC: 143 MMOL/L (ref 132–146)
T4 FREE SERPL-MCNC: 1.74 NG/DL (ref 0.89–1.76)
TSH SERPL DL<=0.005 MIU/L-ACNC: 0.95 MIU/ML (ref 0.35–5.35)
WBC # BLD AUTO: 7.81 10*3/MM3 (ref 3.5–10.8)

## 2019-01-25 ENCOUNTER — TELEPHONE (OUTPATIENT)
Dept: FAMILY MEDICINE CLINIC | Facility: CLINIC | Age: 59
End: 2019-01-25

## 2019-01-25 DIAGNOSIS — E89.0 POSTABLATIVE HYPOTHYROIDISM: ICD-10-CM

## 2019-01-25 RX ORDER — LEVOTHYROXINE SODIUM 0.1 MG/1
100 TABLET ORAL DAILY
Qty: 30 TABLET | Refills: 5 | Status: SHIPPED | OUTPATIENT
Start: 2019-01-25 | End: 2019-08-23 | Stop reason: SDUPTHER

## 2019-01-25 NOTE — TELEPHONE ENCOUNTER
----- Message from Tom Patricia sent at 1/25/2019 12:58 PM EST -----  Contact: PT; HAKEEM  PATIENT WANTS TO GO WITH LOWER DOSE AND SHE DOESN'T WANT TO ALTERNATE ANYMORE    levothyroxine (SYNTHROID) 100 MCG tablet     JESUS ALBERTO PETER    PHONE: [phone]

## 2019-04-10 ENCOUNTER — TRANSCRIBE ORDERS (OUTPATIENT)
Dept: ADMINISTRATIVE | Facility: HOSPITAL | Age: 59
End: 2019-04-10

## 2019-04-10 DIAGNOSIS — Z12.31 VISIT FOR SCREENING MAMMOGRAM: Primary | ICD-10-CM

## 2019-04-30 ENCOUNTER — OFFICE VISIT (OUTPATIENT)
Dept: FAMILY MEDICINE CLINIC | Facility: CLINIC | Age: 59
End: 2019-04-30

## 2019-04-30 VITALS
BODY MASS INDEX: 28.68 KG/M2 | RESPIRATION RATE: 18 BRPM | TEMPERATURE: 98 F | WEIGHT: 168 LBS | DIASTOLIC BLOOD PRESSURE: 82 MMHG | HEART RATE: 76 BPM | SYSTOLIC BLOOD PRESSURE: 152 MMHG | HEIGHT: 64 IN

## 2019-04-30 DIAGNOSIS — Z23 NEED FOR SHINGLES VACCINE: ICD-10-CM

## 2019-04-30 DIAGNOSIS — R45.89 DEPRESSED MOOD: ICD-10-CM

## 2019-04-30 DIAGNOSIS — E55.9 VITAMIN D DEFICIENCY: ICD-10-CM

## 2019-04-30 DIAGNOSIS — Z00.00 WELL ADULT EXAM: Primary | ICD-10-CM

## 2019-04-30 DIAGNOSIS — E89.0 POSTABLATIVE HYPOTHYROIDISM: ICD-10-CM

## 2019-04-30 DIAGNOSIS — M79.7 FIBROMYALGIA: ICD-10-CM

## 2019-04-30 DIAGNOSIS — F51.02 ADJUSTMENT INSOMNIA: ICD-10-CM

## 2019-04-30 PROCEDURE — 99396 PREV VISIT EST AGE 40-64: CPT | Performed by: FAMILY MEDICINE

## 2019-04-30 RX ORDER — DULOXETIN HYDROCHLORIDE 30 MG/1
CAPSULE, DELAYED RELEASE ORAL
COMMUNITY
Start: 2019-01-28 | End: 2019-04-30 | Stop reason: SDUPTHER

## 2019-04-30 RX ORDER — DULOXETIN HYDROCHLORIDE 60 MG/1
60 CAPSULE, DELAYED RELEASE ORAL DAILY
Qty: 30 CAPSULE | Refills: 5 | Status: SHIPPED | OUTPATIENT
Start: 2019-04-30 | End: 2019-09-09

## 2019-04-30 RX ORDER — TEMAZEPAM 30 MG/1
30 CAPSULE ORAL NIGHTLY PRN
Qty: 30 CAPSULE | Refills: 0 | Status: SHIPPED | OUTPATIENT
Start: 2019-04-30

## 2019-05-01 LAB
25(OH)D3+25(OH)D2 SERPL-MCNC: 82.6 NG/ML (ref 30–100)
ALBUMIN SERPL-MCNC: 4.9 G/DL (ref 3.5–5.2)
ALBUMIN/GLOB SERPL: 1.8 G/DL
ALP SERPL-CCNC: 72 U/L (ref 39–117)
ALT SERPL-CCNC: 26 U/L (ref 1–33)
AST SERPL-CCNC: 25 U/L (ref 1–32)
BASOPHILS # BLD AUTO: 0.09 10*3/MM3 (ref 0–0.2)
BASOPHILS NFR BLD AUTO: 1.2 % (ref 0–1.5)
BILIRUB SERPL-MCNC: 0.7 MG/DL (ref 0.2–1.2)
BUN SERPL-MCNC: 13 MG/DL (ref 8–23)
BUN/CREAT SERPL: 19.1 (ref 7–25)
CALCIUM SERPL-MCNC: 10.1 MG/DL (ref 8.6–10.5)
CHLORIDE SERPL-SCNC: 100 MMOL/L (ref 98–107)
CHOLEST SERPL-MCNC: 232 MG/DL (ref 0–200)
CHOLEST/HDLC SERPL: 3.31 {RATIO}
CO2 SERPL-SCNC: 26.1 MMOL/L (ref 22–29)
CREAT SERPL-MCNC: 0.68 MG/DL (ref 0.57–1)
EOSINOPHIL # BLD AUTO: 0.13 10*3/MM3 (ref 0–0.4)
EOSINOPHIL NFR BLD AUTO: 1.7 % (ref 0.3–6.2)
ERYTHROCYTE [DISTWIDTH] IN BLOOD BY AUTOMATED COUNT: 12.5 % (ref 12.3–15.4)
GLOBULIN SER CALC-MCNC: 2.7 GM/DL
GLUCOSE SERPL-MCNC: 92 MG/DL (ref 65–99)
HCT VFR BLD AUTO: 40.5 % (ref 34–46.6)
HDLC SERPL-MCNC: 70 MG/DL (ref 40–60)
HGB BLD-MCNC: 13 G/DL (ref 12–15.9)
IMM GRANULOCYTES # BLD AUTO: 0.02 10*3/MM3 (ref 0–0.05)
IMM GRANULOCYTES NFR BLD AUTO: 0.3 % (ref 0–0.5)
LDLC SERPL CALC-MCNC: 136 MG/DL (ref 0–100)
LYMPHOCYTES # BLD AUTO: 2.82 10*3/MM3 (ref 0.7–3.1)
LYMPHOCYTES NFR BLD AUTO: 37.3 % (ref 19.6–45.3)
MCH RBC QN AUTO: 30.7 PG (ref 26.6–33)
MCHC RBC AUTO-ENTMCNC: 32.1 G/DL (ref 31.5–35.7)
MCV RBC AUTO: 95.7 FL (ref 79–97)
MONOCYTES # BLD AUTO: 0.41 10*3/MM3 (ref 0.1–0.9)
MONOCYTES NFR BLD AUTO: 5.4 % (ref 5–12)
NEUTROPHILS # BLD AUTO: 4.1 10*3/MM3 (ref 1.7–7)
NEUTROPHILS NFR BLD AUTO: 54.1 % (ref 42.7–76)
NRBC BLD AUTO-RTO: 0 /100 WBC (ref 0–0.2)
PLATELET # BLD AUTO: 245 10*3/MM3 (ref 140–450)
POTASSIUM SERPL-SCNC: 3.9 MMOL/L (ref 3.5–5.2)
PROT SERPL-MCNC: 7.6 G/DL (ref 6–8.5)
RBC # BLD AUTO: 4.23 10*6/MM3 (ref 3.77–5.28)
SODIUM SERPL-SCNC: 142 MMOL/L (ref 136–145)
TRIGL SERPL-MCNC: 129 MG/DL (ref 0–150)
TSH SERPL DL<=0.005 MIU/L-ACNC: 1.19 UIU/ML (ref 0.45–4.5)
VLDLC SERPL CALC-MCNC: 25.8 MG/DL
WBC # BLD AUTO: 7.57 10*3/MM3 (ref 3.4–10.8)

## 2019-05-16 ENCOUNTER — HOSPITAL ENCOUNTER (OUTPATIENT)
Dept: MAMMOGRAPHY | Facility: HOSPITAL | Age: 59
Discharge: HOME | End: 2019-05-16
Admitting: FAMILY MEDICINE

## 2019-05-16 DIAGNOSIS — Z12.31 VISIT FOR SCREENING MAMMOGRAM: ICD-10-CM

## 2019-05-16 PROCEDURE — 77067 SCR MAMMO BI INCL CAD: CPT | Performed by: RADIOLOGY

## 2019-05-16 PROCEDURE — 77067 SCR MAMMO BI INCL CAD: CPT

## 2019-05-16 PROCEDURE — 77063 BREAST TOMOSYNTHESIS BI: CPT

## 2019-05-16 PROCEDURE — 77063 BREAST TOMOSYNTHESIS BI: CPT | Performed by: RADIOLOGY

## 2019-05-21 ENCOUNTER — OFFICE VISIT (OUTPATIENT)
Dept: FAMILY MEDICINE CLINIC | Facility: CLINIC | Age: 59
End: 2019-05-21

## 2019-05-21 VITALS
HEART RATE: 78 BPM | DIASTOLIC BLOOD PRESSURE: 80 MMHG | TEMPERATURE: 98.5 F | RESPIRATION RATE: 18 BRPM | BODY MASS INDEX: 28.77 KG/M2 | SYSTOLIC BLOOD PRESSURE: 138 MMHG | HEIGHT: 64 IN | WEIGHT: 168.5 LBS

## 2019-05-21 DIAGNOSIS — R53.83 OTHER FATIGUE: ICD-10-CM

## 2019-05-21 DIAGNOSIS — E21.3 HYPERPARATHYROIDISM (HCC): Primary | ICD-10-CM

## 2019-05-21 PROCEDURE — 99213 OFFICE O/P EST LOW 20 MIN: CPT | Performed by: FAMILY MEDICINE

## 2019-05-21 NOTE — PROGRESS NOTES
Assessment/Plan       Problems Addressed this Visit        Endocrine    Hyperparathyroidism (CMS/[facility]) - Primary    Relevant Orders    PTH, Intact & Calcium       Other    Fatigue            Follow up: Return for follow up depends on review of labs and testing.     DISCUSSION  Mild relative increase in her calcium level.  She has symptoms like she did previously with hyperparathyroidism.  Would like to have this checked.  We will check PTH and calcium.  Further plan once labs reviewed.    May be due to side effects of Cymbalta as well and she would like to wean off of this.  She is on 30 mg daily at this time.  She tried to go to 60 but had increasing symptoms.  Recommend go to every other day Cymbalta for 1 week and then every third day for 3 doses and then try off.  She is agreeable.      MEDICATIONS PRESCRIBED  Requested Prescriptions      No prescriptions requested or ordered in this encounter                -------------------------------------------    Subjective     Chief Complaint   Patient presents with   • Hypertension     up more at home    • discuss meds         History of Present Illness    Feels like when she had hyperparathyroidism  Tired all the time      2015 calcium hand been increasing    Had partial parathyroidectomy. 1/25/2017    Has been on Zoloft and made her feel hyper    Cymbalta now makes her feel that way    Feels like she did back then    Decreased sleep  Removed just 1 Parathyroid gland ( right side) 2017    Corrected calcium 9.38  Alb 4.9  Esdras 10.1    Wants to stop the cymbalta. Tried the 60 mg and had side effects      Feels jittery      Social History     Tobacco Use   Smoking Status Never Smoker   Smokeless Tobacco Never Used        Past Medical History,Medications, Allergies, and social history was reviewed.      Review of Systems   Constitutional: Positive for fatigue.   HENT: Negative.    Eyes: Negative.    Respiratory: Negative.    Cardiovascular: Negative.    Gastrointestinal:  "Negative.    Psychiatric/Behavioral: Positive for sleep disturbance. The patient is nervous/anxious.        Objective     Vitals:    05/21/19 1438   BP: 138/80   Pulse: 78   Resp: 18   Temp: 98.5 °F (36.9 °C)   Weight: 76.4 kg (168 lb 8 oz)   Height: 162.6 cm (64\")          Physical Exam   Constitutional: She appears well-developed and well-nourished.   HENT:   Head: Normocephalic and atraumatic.   Right Ear: Hearing normal.   Left Ear: Hearing normal.   Eyes: Conjunctivae and EOM are normal. Pupils are equal, round, and reactive to light.   Neck: Normal range of motion. Neck supple. No thyromegaly present.   Cardiovascular: Normal rate, regular rhythm and normal heart sounds. Exam reveals no gallop and no friction rub.   No murmur heard.  Pulmonary/Chest: Effort normal and breath sounds normal. No respiratory distress. She has no wheezes. She has no rales.   Musculoskeletal: She exhibits no edema.   Lymphadenopathy:     She has no cervical adenopathy.   Neurological: She is alert.   Skin: Skin is warm and dry.   Psychiatric: She has a normal mood and affect.   Nursing note and vitals reviewed.                Berry Perez MD    "

## 2019-05-22 LAB
CALCIUM SERPL-MCNC: 9.9 MG/DL (ref 8.7–10.3)
INTACT PTH: NORMAL
PTH-INTACT SERPL-MCNC: 29 PG/ML (ref 15–65)

## 2019-06-21 ENCOUNTER — TELEPHONE (OUTPATIENT)
Dept: FAMILY MEDICINE CLINIC | Facility: CLINIC | Age: 59
End: 2019-06-21

## 2019-06-21 ENCOUNTER — RESULTS ENCOUNTER (OUTPATIENT)
Dept: FAMILY MEDICINE CLINIC | Facility: CLINIC | Age: 59
End: 2019-06-21

## 2019-06-21 DIAGNOSIS — E21.3 HYPERPARATHYROIDISM (HCC): ICD-10-CM

## 2019-06-21 DIAGNOSIS — E21.3 HYPERPARATHYROIDISM (HCC): Primary | ICD-10-CM

## 2019-06-21 NOTE — TELEPHONE ENCOUNTER
----- Message from Tom Patricia Rep sent at 6/21/2019  9:14 AM EDT -----  Contact: PT; HAKEEM  PATIENT IS WANTING TO KNOW CAN DR PALACIO PLACE AN ORDER FOR HER TSH, CALCIUM AND PTH?  SHE STATES SHE FEELS A LITTLE OFF BALANCE.    PT: [phone]

## 2019-06-22 LAB — TSH SERPL DL<=0.005 MIU/L-ACNC: 2.83 UIU/ML (ref 0.45–4.5)

## 2019-06-25 LAB
CALCIUM SERPL-MCNC: 9.8 MG/DL (ref 8.7–10.3)
INTACT PTH: NORMAL
PTH-INTACT SERPL-MCNC: 42 PG/ML (ref 15–65)

## 2019-07-20 DIAGNOSIS — I10 ESSENTIAL HYPERTENSION: ICD-10-CM

## 2019-07-20 DIAGNOSIS — E89.0 POSTABLATIVE HYPOTHYROIDISM: ICD-10-CM

## 2019-07-22 RX ORDER — HYDROCHLOROTHIAZIDE 12.5 MG/1
CAPSULE, GELATIN COATED ORAL
Qty: 30 CAPSULE | Refills: 4 | Status: SHIPPED | OUTPATIENT
Start: 2019-07-22 | End: 2019-12-23

## 2019-07-22 RX ORDER — POTASSIUM CHLORIDE 20 MEQ/1
TABLET, EXTENDED RELEASE ORAL
Qty: 30 TABLET | Refills: 4 | Status: SHIPPED | OUTPATIENT
Start: 2019-07-22 | End: 2019-12-23

## 2019-07-22 RX ORDER — AMLODIPINE BESYLATE 5 MG/1
TABLET ORAL
Qty: 30 TABLET | Refills: 4 | Status: SHIPPED | OUTPATIENT
Start: 2019-07-22 | End: 2019-12-23

## 2019-07-22 RX ORDER — LEVOTHYROXINE SODIUM 112 UG/1
TABLET ORAL
Qty: 15 TABLET | Refills: 2 | Status: SHIPPED | OUTPATIENT
Start: 2019-07-22 | End: 2019-09-09 | Stop reason: SDUPTHER

## 2019-08-23 DIAGNOSIS — E89.0 POSTABLATIVE HYPOTHYROIDISM: ICD-10-CM

## 2019-08-23 RX ORDER — LEVOTHYROXINE SODIUM 0.1 MG/1
TABLET ORAL
Qty: 30 TABLET | Refills: 1 | Status: SHIPPED | OUTPATIENT
Start: 2019-08-23 | End: 2019-09-09 | Stop reason: SDUPTHER

## 2019-09-09 ENCOUNTER — OFFICE VISIT (OUTPATIENT)
Dept: FAMILY MEDICINE CLINIC | Facility: CLINIC | Age: 59
End: 2019-09-09

## 2019-09-09 VITALS
RESPIRATION RATE: 18 BRPM | SYSTOLIC BLOOD PRESSURE: 134 MMHG | HEART RATE: 78 BPM | DIASTOLIC BLOOD PRESSURE: 74 MMHG | HEIGHT: 64 IN | BODY MASS INDEX: 29.37 KG/M2 | WEIGHT: 172 LBS | TEMPERATURE: 97.7 F

## 2019-09-09 DIAGNOSIS — H57.11 PAIN OF RIGHT EYE: ICD-10-CM

## 2019-09-09 DIAGNOSIS — B37.9 YEAST INFECTION: ICD-10-CM

## 2019-09-09 DIAGNOSIS — E53.8 B12 DEFICIENCY: ICD-10-CM

## 2019-09-09 DIAGNOSIS — E89.0 POSTABLATIVE HYPOTHYROIDISM: ICD-10-CM

## 2019-09-09 DIAGNOSIS — R53.81 MALAISE: Primary | ICD-10-CM

## 2019-09-09 PROCEDURE — 99214 OFFICE O/P EST MOD 30 MIN: CPT | Performed by: FAMILY MEDICINE

## 2019-09-09 RX ORDER — LEVOTHYROXINE SODIUM 0.1 MG/1
100 TABLET ORAL DAILY
Qty: 30 TABLET | Refills: 2 | Status: SHIPPED | OUTPATIENT
Start: 2019-09-09

## 2019-09-09 RX ORDER — LEVOTHYROXINE SODIUM 112 UG/1
112 TABLET ORAL DAILY
Qty: 30 TABLET | Refills: 2 | Status: SHIPPED | OUTPATIENT
Start: 2019-09-09

## 2019-09-09 RX ORDER — FLUCONAZOLE 100 MG/1
100 TABLET ORAL DAILY
Qty: 7 TABLET | Refills: 0 | Status: SHIPPED | OUTPATIENT
Start: 2019-09-09

## 2019-09-09 RX ORDER — GABAPENTIN 300 MG/1
300 CAPSULE ORAL ONCE
COMMUNITY

## 2019-09-09 NOTE — PROGRESS NOTES
"  Assessment/Plan       Problems Addressed this Visit        Endocrine    Postablative hypothyroidism    Relevant Medications    levothyroxine (SYNTHROID, LEVOTHROID) 100 MCG tablet    levothyroxine (SYNTHROID, LEVOTHROID) 112 MCG tablet    Other Relevant Orders    TSH      Other Visit Diagnoses     Malaise    -  Primary    Relevant Orders    Vitamin B12    Vitamin B6    CBC & Differential    Comprehensive Metabolic Panel    Yeast infection        Relevant Medications    fluconazole (DIFLUCAN) 100 MG tablet    B12 deficiency        Relevant Orders    Vitamin B12    Pain of right eye                Follow up: Return if symptoms worsen or fail to improve.     DISCUSSION  Malaise and fatigue.  Check labs as noted.    Possible yeast infection.  Diflucan 100 mg 1 daily.  Has skin changes consistent with yeast infection as well.    Hypothyroidism.  Recheck TSH.    B12 deficiency.  Recheck B12 level.    Dry lips and eyes.  She is concerned that she has a B6 deficiency.  We will check that as well.    Intermittent pain in the right eye.  Recommend that she the see ophthalmology as scheduled within the week.      MEDICATIONS PRESCRIBED  Requested Prescriptions     Signed Prescriptions Disp Refills   • fluconazole (DIFLUCAN) 100 MG tablet 7 tablet 0     Sig: Take 1 tablet by mouth Daily.   • levothyroxine (SYNTHROID, LEVOTHROID) 100 MCG tablet 30 tablet 2     Sig: Take 1 tablet by mouth Daily. Alternate with 112 mcg dose   • levothyroxine (SYNTHROID, LEVOTHROID) 112 MCG tablet 30 tablet 2     Sig: Take 1 tablet by mouth Daily. alternate with 100 mcg dose            -------------------------------------------    Subjective     Chief Complaint   Patient presents with   • Feels bad         History of Present Illness    Feels bad  Anxiety  Shaky and nervous and does not feel well  Feels bad    Last week, quit sugar. In desserts mainly  Feels some better since then    Skin  Fungus breakouts  Has theory \" 35 yrs ago treated for " "yeast overgrowth\" and helped a lot then. Feel similar to then. Had been on Nystatin for years      Does feel better since stopped sugar  Was having issues with sleeping prior to that    Bloated and bowels messed up  GI system is messed.     No sore throat    + headache    Fungus infection of neck  Has used selsun shampoo and helps    Rash under breast      GERD  Worse as well    Lips dry as well and dry eyes is worse as well    Hypothyroidism  This week 4 112 mcg and 100 mcg   Does not feel well and has been alternating the thyroid medication and since then does feel a little bit better.    Right eye pain  Swollen and pressure  Some pain around it  Vision is ok  no pain to move eye around  Has been that way for 1 year        Social History     Tobacco Use   Smoking Status Never Smoker   Smokeless Tobacco Never Used          Past Medical History,Medications, Allergies, and social history was reviewed.    Past Medical History:   Diagnosis Date   • Acid reflux    • Anemia    • Anxiety    • Arthritis     Back and neck   • Colon polyp     Pre-cancerous polyp   • Depression    • Diverticulosis    • Fibromyalgia, primary 1990   • Graves disease    • History of medical problems 2017    Parathyroidectomy and Prednisone treatment for PMR   • HTN (hypertension)    • Hyperlipidemia    • Hypothyroidism     Unstable medicine levels   • Low back pain 2015   • Menopause    • Migraine    • Migraine    • Osteopenia    • Parathyroid adenoma    • Pneumonia 2005   • Polymyalgia rheumatica (CMS/HCC)    • Thyroid disorder               Review of Systems   Constitutional: Positive for fatigue. Negative for unexpected weight gain.   HENT: Negative.    Respiratory: Negative.    Cardiovascular: Negative.    Gastrointestinal: Negative.  Positive for GERD. Negative for blood in stool, nausea and vomiting.   Musculoskeletal: Positive for arthralgias.   Neurological: Negative.    Psychiatric/Behavioral: Negative.        Objective " "    Vitals:    09/09/19 1226   BP: 134/74   Pulse: 78   Resp: 18   Temp: 97.7 °F (36.5 °C)   Weight: 78 kg (172 lb)   Height: 162.6 cm (64\")          Physical Exam   Constitutional: She appears well-developed and well-nourished.   HENT:   Head: Normocephalic and atraumatic.   Right Ear: Hearing, tympanic membrane, external ear and ear canal normal.   Left Ear: Hearing, tympanic membrane, external ear and ear canal normal.   Mouth/Throat: Oropharynx is clear and moist.   Eyes: Conjunctivae and EOM are normal. Pupils are equal, round, and reactive to light.   Neck: Normal range of motion. Neck supple. No thyromegaly present.   Cardiovascular: Normal rate, regular rhythm and normal heart sounds. Exam reveals no gallop and no friction rub.   No murmur heard.  Pulmonary/Chest: Effort normal and breath sounds normal. No respiratory distress. She has no wheezes. She has no rales.   Abdominal: Soft. Bowel sounds are normal. She exhibits no distension. There is no tenderness. There is no rebound and no guarding.   Musculoskeletal: She exhibits no edema.   Neurological: She is alert.   Skin: Skin is warm and dry.   Psychiatric: She has a normal mood and affect.   Nursing note and vitals reviewed.                Berry Perez MD    "

## 2019-09-12 LAB
ALBUMIN SERPL-MCNC: 4.9 G/DL (ref 3.5–5.2)
ALBUMIN/GLOB SERPL: 1.9 G/DL
ALP SERPL-CCNC: 77 U/L (ref 39–117)
ALT SERPL-CCNC: 21 U/L (ref 1–33)
AST SERPL-CCNC: 22 U/L (ref 1–32)
BASOPHILS # BLD AUTO: 0.09 10*3/MM3 (ref 0–0.2)
BASOPHILS NFR BLD AUTO: 0.9 % (ref 0–1.5)
BILIRUB SERPL-MCNC: 0.6 MG/DL (ref 0.2–1.2)
BUN SERPL-MCNC: 15 MG/DL (ref 8–23)
BUN/CREAT SERPL: 21.7 (ref 7–25)
CALCIUM SERPL-MCNC: 9.8 MG/DL (ref 8.6–10.5)
CHLORIDE SERPL-SCNC: 102 MMOL/L (ref 98–107)
CO2 SERPL-SCNC: 26.9 MMOL/L (ref 22–29)
CREAT SERPL-MCNC: 0.69 MG/DL (ref 0.57–1)
EOSINOPHIL # BLD AUTO: 0.15 10*3/MM3 (ref 0–0.4)
EOSINOPHIL NFR BLD AUTO: 1.4 % (ref 0.3–6.2)
ERYTHROCYTE [DISTWIDTH] IN BLOOD BY AUTOMATED COUNT: 12.4 % (ref 12.3–15.4)
GLOBULIN SER CALC-MCNC: 2.6 GM/DL
GLUCOSE SERPL-MCNC: 94 MG/DL (ref 65–99)
HCT VFR BLD AUTO: 41.8 % (ref 34–46.6)
HGB BLD-MCNC: 13.3 G/DL (ref 12–15.9)
IMM GRANULOCYTES # BLD AUTO: 0.03 10*3/MM3 (ref 0–0.05)
IMM GRANULOCYTES NFR BLD AUTO: 0.3 % (ref 0–0.5)
LYMPHOCYTES # BLD AUTO: 3.02 10*3/MM3 (ref 0.7–3.1)
LYMPHOCYTES NFR BLD AUTO: 28.9 % (ref 19.6–45.3)
MCH RBC QN AUTO: 30.5 PG (ref 26.6–33)
MCHC RBC AUTO-ENTMCNC: 31.8 G/DL (ref 31.5–35.7)
MCV RBC AUTO: 95.9 FL (ref 79–97)
MONOCYTES # BLD AUTO: 0.54 10*3/MM3 (ref 0.1–0.9)
MONOCYTES NFR BLD AUTO: 5.2 % (ref 5–12)
NEUTROPHILS # BLD AUTO: 6.63 10*3/MM3 (ref 1.7–7)
NEUTROPHILS NFR BLD AUTO: 63.3 % (ref 42.7–76)
NRBC BLD AUTO-RTO: 0 /100 WBC (ref 0–0.2)
PLATELET # BLD AUTO: 263 10*3/MM3 (ref 140–450)
POTASSIUM SERPL-SCNC: 3.9 MMOL/L (ref 3.5–5.2)
PROT SERPL-MCNC: 7.5 G/DL (ref 6–8.5)
RBC # BLD AUTO: 4.36 10*6/MM3 (ref 3.77–5.28)
SODIUM SERPL-SCNC: 143 MMOL/L (ref 136–145)
TSH SERPL DL<=0.005 MIU/L-ACNC: 0.81 UIU/ML (ref 0.27–4.2)
VIT B12 SERPL-MCNC: 580 PG/ML (ref 211–946)
VIT B6 SERPL-MCNC: 27.1 UG/L (ref 2–32.8)
WBC # BLD AUTO: 10.46 10*3/MM3 (ref 3.4–10.8)

## 2019-09-24 ENCOUNTER — FLU SHOT (OUTPATIENT)
Dept: FAMILY MEDICINE CLINIC | Facility: CLINIC | Age: 59
End: 2019-09-24

## 2019-09-24 DIAGNOSIS — Z23 FLU VACCINE NEED: ICD-10-CM

## 2019-09-24 PROCEDURE — 90471 IMMUNIZATION ADMIN: CPT | Performed by: FAMILY MEDICINE

## 2019-09-24 PROCEDURE — 90674 CCIIV4 VAC NO PRSV 0.5 ML IM: CPT | Performed by: FAMILY MEDICINE

## 2019-12-02 ENCOUNTER — RESULTS ENCOUNTER (OUTPATIENT)
Dept: FAMILY MEDICINE CLINIC | Facility: CLINIC | Age: 59
End: 2019-12-02

## 2019-12-02 ENCOUNTER — TELEPHONE (OUTPATIENT)
Dept: FAMILY MEDICINE CLINIC | Facility: CLINIC | Age: 59
End: 2019-12-02

## 2019-12-02 DIAGNOSIS — E21.3 HYPERPARATHYROIDISM (HCC): ICD-10-CM

## 2019-12-02 DIAGNOSIS — E89.0 POSTABLATIVE HYPOTHYROIDISM: ICD-10-CM

## 2019-12-02 DIAGNOSIS — E89.0 POSTABLATIVE HYPOTHYROIDISM: Primary | ICD-10-CM

## 2019-12-02 NOTE — TELEPHONE ENCOUNTER
HAKEEM; PT CALLED    PT IS REQUESTING A THYROID PANEL  ORDER PLACED-SHE IS HAVING PROBLEMS WITH WEIGHT GAIN AND  JITTERY - SHE FEELS SHE MAY NEED  AN ADJUSTMENT TO MEDS    [phone]

## 2019-12-04 LAB
CALCIUM SERPL-MCNC: 9.2 MG/DL (ref 8.7–10.3)
INTACT PTH: NORMAL
PTH-INTACT SERPL-MCNC: 26 PG/ML (ref 15–65)
T3 SERPL-MCNC: 82 NG/DL (ref 71–180)
T4 FREE SERPL-MCNC: 1.41 NG/DL (ref 0.93–1.7)
TSH SERPL DL<=0.005 MIU/L-ACNC: 1.9 UIU/ML (ref 0.27–4.2)

## 2019-12-22 DIAGNOSIS — I10 ESSENTIAL HYPERTENSION: ICD-10-CM

## 2019-12-23 RX ORDER — AMLODIPINE BESYLATE 5 MG/1
TABLET ORAL
Qty: 30 TABLET | Refills: 3 | Status: SHIPPED | OUTPATIENT
Start: 2019-12-23

## 2019-12-23 RX ORDER — HYDROCHLOROTHIAZIDE 12.5 MG/1
CAPSULE, GELATIN COATED ORAL
Qty: 30 CAPSULE | Refills: 3 | Status: SHIPPED | OUTPATIENT
Start: 2019-12-23

## 2019-12-23 RX ORDER — POTASSIUM CHLORIDE 20 MEQ/1
TABLET, EXTENDED RELEASE ORAL
Qty: 30 TABLET | Refills: 3 | Status: SHIPPED | OUTPATIENT
Start: 2019-12-23

## 2023-04-06 NOTE — PROGRESS NOTES
Assessment/Plan       Problems Addressed this Visit        Digestive    Vitamin D deficiency    Relevant Orders    Vitamin D 25 Hydroxy       Endocrine    Postablative hypothyroidism    Relevant Orders    TSH       Nervous and Auditory    Fibromyalgia    Relevant Medications    DULoxetine (CYMBALTA) 60 MG capsule       Other    Depressed mood    Relevant Medications    DULoxetine (CYMBALTA) 60 MG capsule      Other Visit Diagnoses     Well adult exam    -  Primary    Relevant Orders    CBC & Differential    Comprehensive Metabolic Panel    Lipid Panel With / Chol / HDL Ratio    Adjustment insomnia        Relevant Medications    temazepam (RESTORIL) 30 MG capsule    Need for shingles vaccine        Relevant Medications    Zoster Vac Recomb Adjuvanted 50 MCG/0.5ML reconstituted suspension            Follow up: Return for follow up depends on review of labs and testing.     DISCUSSION  Well examination.  Continue routine health maintenance including routine dentistry, eye exams, safety, gynecological care, proper nutrition and exercise.    Depressed mood with history of fibromyalgia.  Will increase Cymbalta to 60 mg daily.    Insomnia.  Continue melatonin and may use temazepam 30 mg as needed especially with travel.    Hypothyroidism.  Check TSH.    Prescription for shingles vaccine was given.    Vitamin D deficiency.  Recheck vitamin D level.      MEDICATIONS PRESCRIBED  Requested Prescriptions     Signed Prescriptions Disp Refills   • DULoxetine (CYMBALTA) 60 MG capsule 30 capsule 5     Sig: Take 1 capsule by mouth Daily.   • temazepam (RESTORIL) 30 MG capsule 30 capsule 0     Sig: Take 1 capsule by mouth At Night As Needed for Sleep.   • Zoster Vac Recomb Adjuvanted 50 MCG/0.5ML reconstituted suspension 1 each 1     Sig: Inject 50 mcg into the appropriate muscle as directed by prescriber 1 (One) Time for 1 dose.            Louis dated on 4/30/2019   was reviewed and appropriate.         -------------------------------------------    Subjective     Chief Complaint   Patient presents with   • Annual Exam     The patient is a 59 y.o. female who presents with well exam     Nutrition:  eating healthy and void sugar  Exercise:  +   Sleep:  decreased some.     Seat Belt: + seat belt    Dentist: + q 6 months    Eye exam: Summer 2018       Advanced Directives:  No living will     Last Colonoscopy:  2016 + polyp     Past Gynecological History:     No LMP recorded. Patient is postmenopausal.     Pap History:4/2018     Date of last mammogram: 4/2018. Has one scheduled for 5/16/2019    Past Medical History,Medications, Allergies reviewed.       DEXA: Dr Foster did in office on Oct 2018. Osteopenia       Other concerns/problems:       History of Present Illness    Depressed mood and fibromyalgia    Dx: Dr Foster officially diagnosed with Fibromyalgia  Stated on cymbalta 30 mg and helped but now some increased pain and feels depressed some. Wants to cry all the time. No suicidal ideation.     Much less pain with the cymbalta    + tired still     Tried melatonin to sleep and helps    Has been going to [place] q month to visit family    ------------------    Used to take temazepam as needed for sleep  Going on a cruise in Oct and would like 30 mg temazepam as needed for sleep  Needs new rx    -------------------  Hypothyroidism  Stable on 100 mcg   Feels underactive right now since increased weight  Feels like absorption issue (endo thought)  Thought was gluten related  Sugar may have in a impact.   Not sure if takes biotin in vitamin            Social History     Tobacco Use   Smoking Status Never Smoker   Smokeless Tobacco Never Used        Past Medical History,Medications, Allergies, and social history was reviewed.      Review of Systems   Constitutional: Positive for fatigue and unexpected weight gain.   HENT: Negative.    Respiratory: Negative.    Cardiovascular: Negative.    Gastrointestinal: Negative.   "  Musculoskeletal: Positive for arthralgias and myalgias.   Psychiatric/Behavioral: Positive for depressed mood.       Objective     Vitals:    04/30/19 1153   BP: 152/82   Pulse: 76   Resp: 18   Temp: 98 °F (36.7 °C)   Weight: 76.2 kg (168 lb)   Height: 162.6 cm (64\")          Physical Exam   Constitutional: She appears well-developed and well-nourished.   HENT:   Head: Normocephalic and atraumatic.   Right Ear: Hearing, tympanic membrane, external ear and ear canal normal.   Left Ear: Hearing, tympanic membrane, external ear and ear canal normal.   Mouth/Throat: Oropharynx is clear and moist.   Eyes: Conjunctivae and EOM are normal. Pupils are equal, round, and reactive to light.   Neck: Normal range of motion. Neck supple. No thyromegaly present.   Cardiovascular: Normal rate, regular rhythm and normal heart sounds. Exam reveals no gallop and no friction rub.   No murmur heard.  Pulmonary/Chest: Effort normal and breath sounds normal. No respiratory distress. She has no wheezes. She has no rales.   Abdominal: Soft. Bowel sounds are normal. She exhibits no distension. There is no tenderness. There is no rebound and no guarding.   Musculoskeletal: She exhibits no edema.   Neurological: She is alert.   Skin: Skin is warm and dry.   Psychiatric: She has a normal mood and affect.   Nursing note and vitals reviewed.                Berry Perez MD    " No restrictions